# Patient Record
Sex: FEMALE | Race: WHITE | NOT HISPANIC OR LATINO | Employment: OTHER | URBAN - METROPOLITAN AREA
[De-identification: names, ages, dates, MRNs, and addresses within clinical notes are randomized per-mention and may not be internally consistent; named-entity substitution may affect disease eponyms.]

---

## 2017-08-07 ENCOUNTER — HISTORICAL (OUTPATIENT)
Dept: RADIOLOGY | Facility: HOSPITAL | Age: 72
End: 2017-08-07

## 2018-11-20 ENCOUNTER — HISTORICAL (OUTPATIENT)
Dept: RADIOLOGY | Facility: HOSPITAL | Age: 73
End: 2018-11-20

## 2020-01-02 ENCOUNTER — HISTORICAL (OUTPATIENT)
Dept: RADIOLOGY | Facility: HOSPITAL | Age: 75
End: 2020-01-02

## 2021-01-27 ENCOUNTER — HISTORICAL (OUTPATIENT)
Dept: RADIOLOGY | Facility: HOSPITAL | Age: 76
End: 2021-01-27

## 2021-02-11 ENCOUNTER — HISTORICAL (OUTPATIENT)
Dept: RADIOLOGY | Facility: HOSPITAL | Age: 76
End: 2021-02-11

## 2022-02-15 ENCOUNTER — HISTORICAL (OUTPATIENT)
Dept: RADIOLOGY | Facility: HOSPITAL | Age: 77
End: 2022-02-15

## 2022-02-15 ENCOUNTER — HISTORICAL (OUTPATIENT)
Dept: ADMINISTRATIVE | Facility: HOSPITAL | Age: 77
End: 2022-02-15

## 2022-02-23 ENCOUNTER — HISTORICAL (OUTPATIENT)
Dept: RADIOLOGY | Facility: HOSPITAL | Age: 77
End: 2022-02-23

## 2022-02-23 ENCOUNTER — HISTORICAL (OUTPATIENT)
Dept: ADMINISTRATIVE | Facility: HOSPITAL | Age: 77
End: 2022-02-23

## 2023-02-06 DIAGNOSIS — Z12.31 ENCOUNTER FOR SCREENING MAMMOGRAM FOR BREAST CANCER: Primary | ICD-10-CM

## 2023-02-20 ENCOUNTER — HOSPITAL ENCOUNTER (OUTPATIENT)
Dept: RADIOLOGY | Facility: HOSPITAL | Age: 78
Discharge: HOME | End: 2023-02-20
Attending: FAMILY MEDICINE
Payer: MEDICARE

## 2023-02-20 DIAGNOSIS — Z12.31 ENCOUNTER FOR SCREENING MAMMOGRAM FOR BREAST CANCER: ICD-10-CM

## 2023-02-20 PROCEDURE — 77067 MAMMO DIGITAL SCREENING BILAT WITH TOMO: ICD-10-PCS | Mod: 26,,, | Performed by: STUDENT IN AN ORGANIZED HEALTH CARE EDUCATION/TRAINING PROGRAM

## 2023-02-20 PROCEDURE — 77067 SCR MAMMO BI INCL CAD: CPT | Mod: 26,,, | Performed by: STUDENT IN AN ORGANIZED HEALTH CARE EDUCATION/TRAINING PROGRAM

## 2023-02-20 PROCEDURE — 77063 MAMMO DIGITAL SCREENING BILAT WITH TOMO: ICD-10-PCS | Mod: 26,,, | Performed by: STUDENT IN AN ORGANIZED HEALTH CARE EDUCATION/TRAINING PROGRAM

## 2023-02-20 PROCEDURE — 77063 BREAST TOMOSYNTHESIS BI: CPT | Mod: 26,,, | Performed by: STUDENT IN AN ORGANIZED HEALTH CARE EDUCATION/TRAINING PROGRAM

## 2023-02-20 PROCEDURE — 77067 SCR MAMMO BI INCL CAD: CPT | Mod: TC

## 2024-02-28 ENCOUNTER — HOSPITAL ENCOUNTER (OUTPATIENT)
Dept: RADIOLOGY | Facility: HOSPITAL | Age: 79
Discharge: HOME | End: 2024-02-28
Attending: FAMILY MEDICINE
Payer: MEDICARE

## 2024-02-28 DIAGNOSIS — Z12.31 ENCOUNTER FOR SCREENING MAMMOGRAM FOR MALIGNANT NEOPLASM OF BREAST: ICD-10-CM

## 2024-02-28 PROCEDURE — 77067 SCR MAMMO BI INCL CAD: CPT | Mod: 26,,, | Performed by: STUDENT IN AN ORGANIZED HEALTH CARE EDUCATION/TRAINING PROGRAM

## 2024-02-28 PROCEDURE — 77067 SCR MAMMO BI INCL CAD: CPT | Mod: TC

## 2024-02-28 PROCEDURE — 77063 BREAST TOMOSYNTHESIS BI: CPT | Mod: 26,,, | Performed by: STUDENT IN AN ORGANIZED HEALTH CARE EDUCATION/TRAINING PROGRAM

## 2024-04-18 DIAGNOSIS — F03.90 DEMENTIA: Primary | ICD-10-CM

## 2024-05-09 ENCOUNTER — OFFICE VISIT (OUTPATIENT)
Dept: NEUROLOGY | Facility: CLINIC | Age: 79
End: 2024-05-09
Payer: MEDICARE

## 2024-05-09 VITALS
HEIGHT: 64 IN | BODY MASS INDEX: 20.49 KG/M2 | SYSTOLIC BLOOD PRESSURE: 122 MMHG | DIASTOLIC BLOOD PRESSURE: 76 MMHG | WEIGHT: 120 LBS

## 2024-05-09 DIAGNOSIS — R41.9 COGNITIVE COMPLAINTS: ICD-10-CM

## 2024-05-09 DIAGNOSIS — F03.90 DEMENTIA, UNSPECIFIED DEMENTIA SEVERITY, UNSPECIFIED DEMENTIA TYPE, UNSPECIFIED WHETHER BEHAVIORAL, PSYCHOTIC, OR MOOD DISTURBANCE OR ANXIETY: Primary | ICD-10-CM

## 2024-05-09 PROCEDURE — 99205 OFFICE O/P NEW HI 60 MIN: CPT | Mod: S$PBB,,, | Performed by: NURSE PRACTITIONER

## 2024-05-09 PROCEDURE — 99999 PR PBB SHADOW E&M-EST. PATIENT-LVL III: CPT | Mod: PBBFAC,,, | Performed by: NURSE PRACTITIONER

## 2024-05-09 PROCEDURE — 99213 OFFICE O/P EST LOW 20 MIN: CPT | Mod: PBBFAC | Performed by: NURSE PRACTITIONER

## 2024-05-09 RX ORDER — MULTIVIT WITH MINERALS/HERBS
1 TABLET ORAL DAILY
COMMUNITY

## 2024-05-09 RX ORDER — TRAZODONE HYDROCHLORIDE 50 MG/1
50 TABLET ORAL NIGHTLY
COMMUNITY
Start: 2024-04-15

## 2024-05-09 RX ORDER — RIVAROXABAN 20 MG/1
20 TABLET, FILM COATED ORAL DAILY
COMMUNITY
Start: 2024-03-14

## 2024-05-09 RX ORDER — LIOTHYRONINE SODIUM 5 UG/1
5 TABLET ORAL DAILY
COMMUNITY

## 2024-05-09 RX ORDER — CHOLECALCIFEROL (VITAMIN D3) 25 MCG
1000 TABLET ORAL DAILY
COMMUNITY

## 2024-05-09 RX ORDER — METOPROLOL SUCCINATE 50 MG/1
50 TABLET, EXTENDED RELEASE ORAL DAILY
COMMUNITY
Start: 2024-04-06

## 2024-05-09 RX ORDER — UBIDECARENONE 30 MG
30 CAPSULE ORAL 3 TIMES DAILY
COMMUNITY

## 2024-05-09 RX ORDER — ATORVASTATIN CALCIUM 20 MG/1
20 TABLET, FILM COATED ORAL DAILY
COMMUNITY

## 2024-05-09 RX ORDER — DILTIAZEM HYDROCHLORIDE 120 MG/1
120 TABLET, FILM COATED ORAL
COMMUNITY
Start: 2024-03-17

## 2024-05-09 NOTE — PROGRESS NOTES
New Neurology Patient     SUBJECTIVE:  Patient ID: Pema Ruiz   78 y.o.   Referred By: Dr. Jennie Ag    Past Medical History:   Diagnosis Date    Basal cell carcinoma     Mixed hyperlipidemia     Paroxysmal atrial fibrillation    Breast cancer  Uterine cancer    Past Surgical History:   Procedure Laterality Date    APPENDECTOMY      HYSTERECTOMY      TONSILLECTOMY     Breast lumpectomy  Radiation of uterus    Review of patient's allergies indicates:  No Known Allergies    Chief Complaint: New Patient referred by  for Neurologic consultation to evaluate cognitive complaints     History of Present Illness:    New Patient referred by  for Neurologic consultation to evaluate cognitive complaints      Decline in memory for about 6-9 months and continues to decline.    Forgets names, conversations and repeats herself.     ADL's   - Living situation:  lives at home with    - Driving:    drives during the day. No accidents or not getting lost    - Cooking:   cooks   - Grooming:  no issues   - Highest level of education: Went through tate year of college   - Work: .    Family:   -   for 58 years  - Children: 3 girls    Finances:   -   manages finances    Appetite:  - Good/stable.   - No change in weight      Mood   - Depression:  no   - Anxiety:  yes   - Other behavior concerns/changes: blames herself for things out of her control     Safety concerns:   - No     Sleep  - Sleeps well most nights.   - Frequent nocturnal awakenings    Family history of dementia: Mom had AD    Tried 2 different medications for memory. Unsure of name of meds. Both caused GI upset    Has hearing aids but doesn't wear them      Review of Systems - as per HPI, otherwise a balanced 10 systems review is negative.      Current Medications:  Current Outpatient Medications   Medication Instructions    atorvastatin (LIPITOR) 20 mg, Oral, Daily    b complex vitamins tablet 1  "tablet, Oral, Daily    co-enzyme Q-10 30 mg, Oral, 3 times daily    diltiaZEM (CARDIZEM) 120 mg, Oral    liothyronine (CYTOMEL) 5 mcg, Oral, Daily    metoprolol succinate (TOPROL-XL) 50 mg, Oral, Daily    traZODone (DESYREL) 50 mg, Oral, Nightly    vitamin D (VITAMIN D3) 1,000 Units, Oral, Daily    XARELTO 20 mg, Oral, Daily       OBJECTIVE:  Vitals:  /76   Ht 5' 4" (1.626 m)   Wt 54.4 kg (120 lb)   BMI 20.60 kg/m²       General Exam  Accompanied by -   appearance - well appearing, no apparent distress, unassisted  oropharynx - Mallampati score class 2, ok dentition  skin- no obvious lesions noted    Neurological  cortical function - The patient is alert, attentive, and 26/30; cooperative with exam, good eye contact  Speech - clear and fluent   cranial nerves:  CN 2 - visual fields are full to confrontation.  Pupils are equal and react to light  CN 3, 4, 6 EOMs - normal. No ptosis or lateral gaze deviation  CN 7 - no face asymmetry; normal eye closure and smile  CN 8 - hearing is grossly normal  CN 9, 10, 11- palate elevates symmetrically. Shoulder shrug and head turn intact  CN 12 tongue - protrudes midline with normal movements and no atrophy  motor - No pronator drift. Muscle bulk and tone normal.  No lateralizing or focal deficits noted  gait - unassisted, posture upright. gait is steady with normal steps, arm swing and turning.    reflexes - absent at knees and  ankles  tone - normal  sensation - vib and light touch intact  coordination - finger to nose intact. Romberg absent    Review of Data:   Outside/ prior notes reviewed     Imaging:  No results found for this or any previous visit.      ASSESSMENT/ PLAN:    Problem List Items Addressed This Visit    None  Visit Diagnoses           Cognitive complaints         The following was discussed with both patient and :    - Differential diagnosis and treatment options    -  Will order CT head, and lab work including B12 and TSH       - " Screening for EFREM and importance of treatment, if present     - Reviewed meds for high anticholinergic burden or meds known to cause memory impairment. Avoid Benadryl      - Wearing prescribed hearing aids is beneficial to cognition      - Medications that are currently available are limited to AD disease;Medications have limited efficacy, however, these meds are used to prevent or delay deterioration of cognition in patients with AD.   Pt  Unsure of name of medication prescribed in the past for memory     - Safety concerns reviewed (home safety, polypharmacy and medication management, wandering, fall prevention, kitchen safety, support network, etc.)     - Legal and financial matters discussed;  Advise the family to review legal and financial matters, such as power of , advance directives, and lópez. It is important to ensure that necessary legal documents are in place while the patient can still participate in decision-making.     - To prevent further memory loss, some of the best preventative measures are following a healthy diet, getting regular exercise, and ensuring good sleep habits.       - Minimize or eliminate the use of alcohol, and discuss any prescription medications you might be taking with your doctor to avoid medications which can cause sedation or worsen cognitive function.    - Socializing with friends and family and staying both mentally and physically active is also very important. Continue with hobbies or activities that are engaging and practice activities that are mentally stimulating (word puzzles, Sudoku, etc) and stay active in the community.     F/u in 2 mo            Items discussed include acute and/or chronic neurological, sleep, or other issues and their attendant differential diagnoses.  Potential for additional testing, treatment options, and prognosis also discussed.  Questions and concerns were sought and answered to the patient's stated verbal satisfaction.    The patient  verbalizes understanding and agreement with the above stated treatment plan.     ___single dx _**__multiple issues/ diagnoses  ___ low __mod _*__ high complexity of data  ___low __mod _*__ high risks     Medical Decision Making (MDM) used for CPT choice:  ___low  ___moderate  __*__high        MISTI Martínez  [facility]  [phone]

## 2024-05-15 ENCOUNTER — LAB VISIT (OUTPATIENT)
Dept: LAB | Facility: HOSPITAL | Age: 79
End: 2024-05-15
Attending: NURSE PRACTITIONER
Payer: MEDICARE

## 2024-05-15 DIAGNOSIS — F03.90 DEMENTIA, UNSPECIFIED DEMENTIA SEVERITY, UNSPECIFIED DEMENTIA TYPE, UNSPECIFIED WHETHER BEHAVIORAL, PSYCHOTIC, OR MOOD DISTURBANCE OR ANXIETY: ICD-10-CM

## 2024-05-15 LAB
TSH SERPL-ACNC: 1.91 UIU/ML (ref 0.35–4.94)
VIT B12 SERPL-MCNC: 945 PG/ML (ref 213–816)

## 2024-05-15 PROCEDURE — 84443 ASSAY THYROID STIM HORMONE: CPT

## 2024-05-15 PROCEDURE — 82607 VITAMIN B-12: CPT

## 2024-05-15 PROCEDURE — 36415 COLL VENOUS BLD VENIPUNCTURE: CPT

## 2024-07-10 ENCOUNTER — OFFICE VISIT (OUTPATIENT)
Dept: PRIMARY CARE CLINIC | Facility: CLINIC | Age: 79
End: 2024-07-10
Payer: MEDICARE

## 2024-07-10 VITALS
SYSTOLIC BLOOD PRESSURE: 108 MMHG | RESPIRATION RATE: 17 BRPM | HEIGHT: 64 IN | OXYGEN SATURATION: 96 % | WEIGHT: 126 LBS | BODY MASS INDEX: 21.51 KG/M2 | DIASTOLIC BLOOD PRESSURE: 72 MMHG | HEART RATE: 65 BPM

## 2024-07-10 DIAGNOSIS — Z00.00 ENCOUNTER FOR MEDICAL EXAMINATION TO ESTABLISH CARE: ICD-10-CM

## 2024-07-10 DIAGNOSIS — E78.2 MIXED HYPERLIPIDEMIA: ICD-10-CM

## 2024-07-10 DIAGNOSIS — I48.0 PAROXYSMAL ATRIAL FIBRILLATION: Primary | ICD-10-CM

## 2024-07-10 DIAGNOSIS — M81.0 OSTEOPOROSIS WITHOUT CURRENT PATHOLOGICAL FRACTURE, UNSPECIFIED OSTEOPOROSIS TYPE: ICD-10-CM

## 2024-07-10 PROBLEM — I48.91 ATRIAL FIBRILLATION, UNSPECIFIED TYPE: Status: ACTIVE | Noted: 2024-07-10

## 2024-07-10 PROCEDURE — 99203 OFFICE O/P NEW LOW 30 MIN: CPT | Mod: ,,, | Performed by: FAMILY MEDICINE

## 2024-07-10 RX ORDER — DENOSUMAB 60 MG/ML
60 INJECTION SUBCUTANEOUS
COMMUNITY

## 2024-07-10 NOTE — PROGRESS NOTES
"  Family Medicine    Patient ID: [id number]     Chief Complaint: Establish Care      HPI:     Pema Ruiz is a 78 y.o. female here today to establish care.   Been on prolia 2014. Plans for blood work with Cardiology, appt tomorrow with Dr. Olivas.      Past Medical History:   Diagnosis Date    Basal cell carcinoma     Dementia     Hypothyroidism, unspecified     Mixed hyperlipidemia     Osteoporosis     Paroxysmal atrial fibrillation         Past Surgical History:   Procedure Laterality Date    APPENDECTOMY      BREAST SURGERY      Lump re.oval on left breast    EYE SURGERY      Cadarac surgery    FRACTURE SURGERY      Right shoulder break    HYSTERECTOMY      TONSILLECTOMY          Social History     Tobacco Use    Smoking status: Former     Types: Cigarettes     Passive exposure: Past    Smokeless tobacco: Never   Substance and Sexual Activity    Alcohol use: Not Currently    Drug use: Not Currently    Sexual activity: Not Currently     Partners: Male        Current Outpatient Medications   Medication Instructions    atorvastatin (LIPITOR) 20 mg, Oral, Daily    diltiaZEM (CARDIZEM) 120 mg, Oral    liothyronine (CYTOMEL) 5 mcg, Oral, Daily    metoprolol succinate (TOPROL-XL) 50 mg, Oral, Daily    PROLIA 60 mg, Subcutaneous, Every 6 months    traZODone (DESYREL) 50 mg, Oral, Nightly    vitamin D (VITAMIN D3) 1,000 Units, Oral, Daily    XARELTO 20 mg, Oral, Daily       Review of patient's allergies indicates:  No Known Allergies     Patient Care Team:  Eunice Bagley MD as PCP - General (Family Medicine)     Subjective:     Review of Systems    12 point review of systems conducted, negative except as stated in the history of present illness. See HPI for details.    Objective:     Visit Vitals  /72 (BP Location: Left arm, Patient Position: Sitting, BP Method: Medium (Automatic))   Pulse 65   Resp 17   Ht 5' 4" (1.626 m)   Wt 57.2 kg (126 lb)   SpO2 96%   BMI 21.63 kg/m²       Physical Exam  Vitals and " "nursing note reviewed.   Constitutional:       Appearance: Normal appearance.   HENT:      Mouth/Throat:      Mouth: Mucous membranes are moist.   Cardiovascular:      Rate and Rhythm: Normal rate and regular rhythm.   Pulmonary:      Effort: Pulmonary effort is normal.      Breath sounds: Normal breath sounds.   Neurological:      Mental Status: She is alert. Mental status is at baseline.   Psychiatric:         Mood and Affect: Mood normal.         Labs Reviewed:     Chemistry:  Lab Results   Component Value Date     05/04/2022    K 3.9 05/04/2022    BUN 15 05/04/2022    CREATININE 0.73 05/04/2022    CALCIUM 8.9 05/04/2022    TSH 1.905 05/15/2024        No results found for: "HGBA1C", "MICROALBCREA"     Hematology:  No results found for: "WBC", "HGB", "HCT", "PLT"    Lipid Panel:  Lab Results   Component Value Date    CHOL 139 04/29/2022    HDL 63 04/29/2022    TRIG 72 04/29/2022        Urine:  No results found for: "COLORUA", "APPEARANCEUA", "SGUA", "PHUA", "PROTEINUA", "GLUCOSEUA", "KETONESUA", "BLOODUA", "NITRITESUA", "LEUKOCYTESUR", "RBCUA", "WBCUA", "BACTERIA", "SQEPUA", "HYALINECASTS", "CREATRANDUR", "PROTEINURINE", "UPROTCREA"     Assessment:       ICD-10-CM ICD-9-CM   1. Paroxysmal atrial fibrillation  I48.0 427.31   2. Encounter for medical examination to establish care  Z00.00 V70.9   3. Mixed hyperlipidemia  E78.2 272.2   4. Osteoporosis without current pathological fracture, unspecified osteoporosis type  M81.0 733.00        Plan:     1. Paroxysmal atrial fibrillation  Overview:  Stable.  Continue medications. Will review labs when done by Cardiology.  Hypertension Medications               diltiaZEM (CARDIZEM) 120 MG tablet Take 120 mg by mouth.    metoprolol succinate (TOPROL-XL) 50 MG 24 hr tablet Take 50 mg by mouth once daily.                2. Encounter for medical examination to establish care  Overview:  Wellness planned for 1/2025.        3. Mixed hyperlipidemia  Overview:  Continue dash " diet and regular exercise. Continue Lipitor.        4. Osteoporosis without current pathological fracture, unspecified osteoporosis type  Overview:  On year 10 of prolia.  Will stop the prolia.  Plan for DEXA scan in about 6 months.             Follow up in about 6 months (around 1/10/2025) for Medicare Wellness. In addition to their scheduled follow up, the patient has also been instructed to follow up on as needed basis.     Future Appointments   Date Time Provider Department Center   9/10/2024 10:30 AM Franklin Rodriguez MD [address] [place]   1/13/2025 10:00 AM Eunice Bagley MD [facility] [place]         Eunice Bagley MD

## 2024-09-23 ENCOUNTER — OFFICE VISIT (OUTPATIENT)
Dept: PRIMARY CARE CLINIC | Facility: CLINIC | Age: 79
End: 2024-09-23
Payer: MEDICARE

## 2024-09-23 VITALS
DIASTOLIC BLOOD PRESSURE: 80 MMHG | RESPIRATION RATE: 18 BRPM | HEIGHT: 64 IN | WEIGHT: 121.81 LBS | TEMPERATURE: 98 F | OXYGEN SATURATION: 99 % | SYSTOLIC BLOOD PRESSURE: 132 MMHG | BODY MASS INDEX: 20.79 KG/M2 | HEART RATE: 58 BPM

## 2024-09-23 DIAGNOSIS — L98.491 CALLOUS ULCER, LIMITED TO BREAKDOWN OF SKIN: Primary | ICD-10-CM

## 2024-09-23 PROCEDURE — 99213 OFFICE O/P EST LOW 20 MIN: CPT | Mod: ,,, | Performed by: FAMILY MEDICINE

## 2024-09-23 NOTE — PROGRESS NOTES
Family Medicine    Patient ID: [id number]     Chief Complaint: sore under right foot (X1 month)      HPI:     Pema Ruiz is a 78 y.o. female here today for concern of lesion of the bottom of the R foot noticed about 1 month ago.  Mild pressure pain, tried neosporin with minimal change.  No known FB.  No drainage.     Past Medical History:   Diagnosis Date    Basal cell carcinoma     Dementia     Hypothyroidism, unspecified     Mixed hyperlipidemia     Osteoporosis     Paroxysmal atrial fibrillation         Past Surgical History:   Procedure Laterality Date    APPENDECTOMY      BREAST SURGERY      Lump re.oval on left breast    EYE SURGERY      Cadarac surgery    FRACTURE SURGERY      Right shoulder break    HYSTERECTOMY      TONSILLECTOMY          Social History     Tobacco Use    Smoking status: Former     Types: Cigarettes     Passive exposure: Past    Smokeless tobacco: Never   Substance and Sexual Activity    Alcohol use: Not Currently    Drug use: Not Currently    Sexual activity: Not Currently     Partners: Male        Current Outpatient Medications   Medication Instructions    atorvastatin (LIPITOR) 20 mg, Oral, Daily    diltiaZEM (CARDIZEM) 120 mg, Oral    liothyronine (CYTOMEL) 5 mcg, Oral, Daily    metoprolol succinate (TOPROL-XL) 50 mg, Oral, Daily    PROLIA 60 mg, Subcutaneous, Every 6 months    traZODone (DESYREL) 50 mg, Oral, Nightly    vitamin D (VITAMIN D3) 1,000 Units, Oral, Daily    XARELTO 20 mg, Oral, Daily       Review of patient's allergies indicates:  No Known Allergies     Patient Care Team:  Eunice Bagley MD as PCP - General (Family Medicine)  Aubrey Olivas MD as Consulting Physician (Cardiovascular Disease)     Subjective:     Review of Systems    12 point review of systems conducted, negative except as stated in the history of present illness. See HPI for details.    Objective:     Visit Vitals  /80 (BP Location: Left arm, Patient Position: Sitting, BP Method: Large  "(Automatic))   Pulse (!) 58   Temp 97.8 °F (36.6 °C)   Resp 18   Ht 5' 4" (1.626 m)   Wt 55.2 kg (121 lb 12.8 oz)   SpO2 99%   BMI 20.91 kg/m²       Physical Exam  Vitals and nursing note reviewed.   Constitutional:       Appearance: Normal appearance.   HENT:      Mouth/Throat:      Mouth: Mucous membranes are moist.   Pulmonary:      Effort: Pulmonary effort is normal.   Skin:     Comments: Ball of the L foot with thickened skin approx 2x2cm, no fluctuance, no erythema, no exudate, additional callous at the base of the 1st digit and lateral tip of the 4th toe.    Neurological:      Mental Status: She is alert.   Psychiatric:         Mood and Affect: Mood normal.         Labs Reviewed:     Chemistry:  Lab Results   Component Value Date     05/04/2022    K 3.9 05/04/2022    BUN 15 05/04/2022    CREATININE 0.73 05/04/2022    CALCIUM 8.9 05/04/2022    TSH 1.905 05/15/2024        No results found for: "HGBA1C", "MICROALBCREA"     Hematology:  No results found for: "WBC", "HGB", "HCT", "PLT"    Lipid Panel:  Lab Results   Component Value Date    CHOL 139 04/29/2022    HDL 63 04/29/2022    TRIG 72 04/29/2022        Urine:  No results found for: "COLORUA", "APPEARANCEUA", "SGUA", "PHUA", "PROTEINUA", "GLUCOSEUA", "KETONESUA", "BLOODUA", "NITRITESUA", "LEUKOCYTESUR", "RBCUA", "WBCUA", "BACTERIA", "SQEPUA", "HYALINECASTS", "CREATRANDUR", "PROTEINURINE", "UPROTCREA"     Assessment:       ICD-10-CM ICD-9-CM   1. Callous ulcer, limited to breakdown of skin  L98.491 707.8        Plan:     1. Callous ulcer, limited to breakdown of skin  Overview:  Continue supportive shoe.  Apply vaseline at bedtime. In the morning use an abrasive tool like a Pedi Egg to slowly break down the thickened layers of the callous.  Avoid direct pressure when possible while walking.  Discussed seeing Dr. Domingo Podiatrist if not improving.            No follow-ups on file. In addition to their scheduled follow up, the patient has also been " instructed to follow up on as needed basis.     Future Appointments   Date Time Provider Department Center   1/13/2025 10:00 AM Eunice Bagley MD [facility] [place]

## 2024-09-24 PROBLEM — L98.491 CALLOUS ULCER, LIMITED TO BREAKDOWN OF SKIN: Status: ACTIVE | Noted: 2024-09-24

## 2024-10-14 ENCOUNTER — OFFICE VISIT (OUTPATIENT)
Dept: PRIMARY CARE CLINIC | Facility: CLINIC | Age: 79
End: 2024-10-14
Payer: MEDICARE

## 2024-10-14 VITALS
DIASTOLIC BLOOD PRESSURE: 79 MMHG | HEIGHT: 64 IN | RESPIRATION RATE: 17 BRPM | SYSTOLIC BLOOD PRESSURE: 115 MMHG | BODY MASS INDEX: 19.63 KG/M2 | OXYGEN SATURATION: 96 % | WEIGHT: 115 LBS | TEMPERATURE: 100 F | HEART RATE: 92 BPM

## 2024-10-14 DIAGNOSIS — H65.01 NON-RECURRENT ACUTE SEROUS OTITIS MEDIA OF RIGHT EAR: Primary | ICD-10-CM

## 2024-10-14 PROBLEM — Z00.00 ENCOUNTER FOR MEDICAL EXAMINATION TO ESTABLISH CARE: Status: RESOLVED | Noted: 2024-07-10 | Resolved: 2024-10-14

## 2024-10-14 PROCEDURE — 99214 OFFICE O/P EST MOD 30 MIN: CPT | Mod: ,,, | Performed by: FAMILY MEDICINE

## 2024-10-14 RX ORDER — AMOXICILLIN AND CLAVULANATE POTASSIUM 875; 125 MG/1; MG/1
1 TABLET, FILM COATED ORAL EVERY 12 HOURS
Qty: 14 TABLET | Refills: 0 | Status: SHIPPED | OUTPATIENT
Start: 2024-10-14 | End: 2024-10-21

## 2024-10-14 NOTE — PROGRESS NOTES
Family Medicine    Patient ID: [id number]     Chief Complaint: URI      HPI:     Pema Ruiz is a 79 y.o. female here today for about 2 weeks of cough, congestion, sinusitis.  Initially had fever now with elevated temp 99.9.  No CP or SOB.  No known exposures.     Past Medical History:   Diagnosis Date    Basal cell carcinoma     Dementia     Hypothyroidism, unspecified     Mixed hyperlipidemia     Osteoporosis     Paroxysmal atrial fibrillation         Past Surgical History:   Procedure Laterality Date    APPENDECTOMY      BREAST SURGERY      Lump re.oval on left breast    EYE SURGERY      Cadarac surgery    FRACTURE SURGERY      Right shoulder break    HYSTERECTOMY      TONSILLECTOMY          Social History     Tobacco Use    Smoking status: Former     Types: Cigarettes     Passive exposure: Past    Smokeless tobacco: Never   Substance and Sexual Activity    Alcohol use: Not Currently    Drug use: Not Currently    Sexual activity: Not Currently     Partners: Male        Current Outpatient Medications   Medication Instructions    amoxicillin-clavulanate 875-125mg (AUGMENTIN) 875-125 mg per tablet 1 tablet, Oral, Every 12 hours    atorvastatin (LIPITOR) 20 mg, Daily    diltiaZEM (CARDIZEM) 120 mg    liothyronine (CYTOMEL) 5 mcg, Daily    metoprolol succinate (TOPROL-XL) 50 mg, Daily    PROLIA 60 mg, Every 6 months    traZODone (DESYREL) 50 mg, Nightly    vitamin D (VITAMIN D3) 1,000 Units, Daily    XARELTO 20 mg, Daily       Review of patient's allergies indicates:  No Known Allergies     Patient Care Team:  Eunice Bagley MD as PCP - General (Family Medicine)  Aubrey Olivas MD as Consulting Physician (Cardiovascular Disease)     Subjective:     Review of Systems   Constitutional:  Negative for chills, fatigue and fever.   HENT:  Positive for congestion, ear pain and postnasal drip. Negative for sinus pain, sore throat and trouble swallowing.    Respiratory:  Positive for cough. Negative for chest  "tightness and shortness of breath.    Cardiovascular:  Negative for chest pain.   Skin:  Negative for rash.   Neurological:  Negative for dizziness.   Psychiatric/Behavioral:  Negative for confusion.        12 point review of systems conducted, negative except as stated in the history of present illness. See HPI for details.    Objective:     Visit Vitals  /79   Pulse 92   Temp 99.9 °F (37.7 °C) (Oral)   Resp 17   Ht 5' 4" (1.626 m)   Wt 52.2 kg (115 lb)   SpO2 96%   BMI 19.74 kg/m²       Physical Exam  Vitals and nursing note reviewed.   Constitutional:       Appearance: Normal appearance. She is obese. She is not ill-appearing.   HENT:      Right Ear: External ear normal.      Left Ear: Tympanic membrane, ear canal and external ear normal.      Ears:      Comments: R TM with bulging and erythema with erythematous crusted canal.     Nose: Congestion present.      Mouth/Throat:      Pharynx: Posterior oropharyngeal erythema present.   Eyes:      Conjunctiva/sclera: Conjunctivae normal.   Cardiovascular:      Rate and Rhythm: Normal rate and regular rhythm.   Pulmonary:      Effort: Pulmonary effort is normal.      Breath sounds: Normal breath sounds.   Lymphadenopathy:      Cervical: No cervical adenopathy.   Skin:     General: Skin is warm.   Neurological:      General: No focal deficit present.      Mental Status: She is alert.   Psychiatric:         Mood and Affect: Mood normal.       Labs Reviewed:     Chemistry:  Lab Results   Component Value Date     05/04/2022    K 3.9 05/04/2022    BUN 15 05/04/2022    CREATININE 0.73 05/04/2022    CALCIUM 8.9 05/04/2022    TSH 1.905 05/15/2024        No results found for: "HGBA1C", "MICROALBCREA"     Hematology:  No results found for: "WBC", "HGB", "HCT", "PLT"    Lipid Panel:  Lab Results   Component Value Date    CHOL 139 04/29/2022    HDL 63 04/29/2022    TRIG 72 04/29/2022        Urine:  No results found for: "COLORUA", "APPEARANCEUA", "SGUA", "PHUA", " ""PROTEINUA", "GLUCOSEUA", "KETONESUA", "BLOODUA", "NITRITESUA", "LEUKOCYTESUR", "RBCUA", "WBCUA", "BACTERIA", "SQEPUA", "HYALINECASTS", "CREATRANDUR", "PROTEINURINE", "UPROTCREA"     Assessment:       ICD-10-CM ICD-9-CM   1. Non-recurrent acute serous otitis media of right ear  H65.01 381.01        Plan:     1. Non-recurrent acute serous otitis media of right ear  Overview:  Saline nasal spray twice a day, antihistamine at bedtime.  Force fluids.  Honey for sore throat.  Augmentin with food. Cough may linger a few weeks but should not have fever, chest pain, or shortness of breath.       Orders:  -     amoxicillin-clavulanate 875-125mg (AUGMENTIN) 875-125 mg per tablet; Take 1 tablet by mouth every 12 (twelve) hours. for 7 days  Dispense: 14 tablet; Refill: 0         Follow up if symptoms worsen or fail to improve. In addition to their scheduled follow up, the patient has also been instructed to follow up on as needed basis.     Future Appointments   Date Time Provider Department Center   1/13/2025 10:00 AM [facility]         Eunice Bagley MD  "

## 2025-01-06 DIAGNOSIS — I10 HYPERTENSION, UNSPECIFIED TYPE: ICD-10-CM

## 2025-01-06 DIAGNOSIS — Z00.00 WELLNESS EXAMINATION: Primary | ICD-10-CM

## 2025-01-08 ENCOUNTER — LAB VISIT (OUTPATIENT)
Dept: LAB | Facility: HOSPITAL | Age: 80
End: 2025-01-08
Attending: FAMILY MEDICINE
Payer: MEDICARE

## 2025-01-08 DIAGNOSIS — Z00.00 WELLNESS EXAMINATION: ICD-10-CM

## 2025-01-08 DIAGNOSIS — I10 HYPERTENSION, UNSPECIFIED TYPE: ICD-10-CM

## 2025-01-08 LAB
ALBUMIN SERPL-MCNC: 3.9 G/DL (ref 3.4–4.8)
ALBUMIN/GLOB SERPL: 1.4 RATIO (ref 1.1–2)
ALP SERPL-CCNC: 67 UNIT/L (ref 40–150)
ALT SERPL-CCNC: 21 UNIT/L (ref 0–55)
ANION GAP SERPL CALC-SCNC: 11 MEQ/L
AST SERPL-CCNC: 29 UNIT/L (ref 5–34)
BACTERIA #/AREA URNS AUTO: ABNORMAL /HPF
BASOPHILS # BLD AUTO: 0.03 X10(3)/MCL
BASOPHILS NFR BLD AUTO: 0.7 %
BILIRUB SERPL-MCNC: 0.6 MG/DL
BILIRUB UR QL STRIP.AUTO: NEGATIVE
BUN SERPL-MCNC: 17.3 MG/DL (ref 9.8–20.1)
CALCIUM SERPL-MCNC: 9.2 MG/DL (ref 8.4–10.2)
CHLORIDE SERPL-SCNC: 107 MMOL/L (ref 98–107)
CHOLEST SERPL-MCNC: 182 MG/DL
CHOLEST/HDLC SERPL: 2 {RATIO} (ref 0–5)
CLARITY UR: CLEAR
CO2 SERPL-SCNC: 20 MMOL/L (ref 23–31)
COLOR UR AUTO: YELLOW
CREAT SERPL-MCNC: 0.84 MG/DL (ref 0.55–1.02)
CREAT/UREA NIT SERPL: 21
EOSINOPHIL # BLD AUTO: 0.05 X10(3)/MCL (ref 0–0.9)
EOSINOPHIL NFR BLD AUTO: 1.2 %
ERYTHROCYTE [DISTWIDTH] IN BLOOD BY AUTOMATED COUNT: 14 % (ref 11.5–17)
GFR SERPLBLD CREATININE-BSD FMLA CKD-EPI: >60 ML/MIN/1.73/M2
GLOBULIN SER-MCNC: 2.7 GM/DL (ref 2.4–3.5)
GLUCOSE SERPL-MCNC: 92 MG/DL (ref 82–115)
GLUCOSE UR QL STRIP: NORMAL
HCT VFR BLD AUTO: 39.3 % (ref 37–47)
HDLC SERPL-MCNC: 87 MG/DL (ref 35–60)
HGB BLD-MCNC: 12.5 G/DL (ref 12–16)
HGB UR QL STRIP: ABNORMAL
HYALINE CASTS #/AREA URNS LPF: ABNORMAL /LPF
IMM GRANULOCYTES # BLD AUTO: 0.02 X10(3)/MCL (ref 0–0.04)
IMM GRANULOCYTES NFR BLD AUTO: 0.5 %
KETONES UR QL STRIP: NEGATIVE
LDLC SERPL CALC-MCNC: 82 MG/DL (ref 50–140)
LEUKOCYTE ESTERASE UR QL STRIP: NEGATIVE
LYMPHOCYTES # BLD AUTO: 1.02 X10(3)/MCL (ref 0.6–4.6)
LYMPHOCYTES NFR BLD AUTO: 24.1 %
MCH RBC QN AUTO: 30.1 PG (ref 27–31)
MCHC RBC AUTO-ENTMCNC: 31.8 G/DL (ref 33–36)
MCV RBC AUTO: 94.7 FL (ref 80–94)
MONOCYTES # BLD AUTO: 0.36 X10(3)/MCL (ref 0.1–1.3)
MONOCYTES NFR BLD AUTO: 8.5 %
MUCOUS THREADS URNS QL MICRO: ABNORMAL /LPF
NEUTROPHILS # BLD AUTO: 2.75 X10(3)/MCL (ref 2.1–9.2)
NEUTROPHILS NFR BLD AUTO: 65 %
NITRITE UR QL STRIP: NEGATIVE
NRBC BLD AUTO-RTO: 0 %
PH UR STRIP: 5.5 [PH]
PLATELET # BLD AUTO: 223 X10(3)/MCL (ref 130–400)
PMV BLD AUTO: 10.4 FL (ref 7.4–10.4)
POTASSIUM SERPL-SCNC: 4.3 MMOL/L (ref 3.5–5.1)
PROT SERPL-MCNC: 6.6 GM/DL (ref 5.8–7.6)
PROT UR QL STRIP: ABNORMAL
RBC # BLD AUTO: 4.15 X10(6)/MCL (ref 4.2–5.4)
RBC #/AREA URNS AUTO: ABNORMAL /HPF
SODIUM SERPL-SCNC: 138 MMOL/L (ref 136–145)
SP GR UR STRIP.AUTO: 1.02 (ref 1–1.03)
SQUAMOUS #/AREA URNS LPF: ABNORMAL /HPF
TRIGL SERPL-MCNC: 64 MG/DL (ref 37–140)
TSH SERPL-ACNC: 1.87 UIU/ML (ref 0.35–4.94)
UROBILINOGEN UR STRIP-ACNC: NORMAL
VLDLC SERPL CALC-MCNC: 13 MG/DL
WBC # BLD AUTO: 4.23 X10(3)/MCL (ref 4.5–11.5)
WBC #/AREA URNS AUTO: ABNORMAL /HPF

## 2025-01-08 PROCEDURE — 80061 LIPID PANEL: CPT

## 2025-01-08 PROCEDURE — 80053 COMPREHEN METABOLIC PANEL: CPT

## 2025-01-08 PROCEDURE — 85025 COMPLETE CBC W/AUTO DIFF WBC: CPT

## 2025-01-08 PROCEDURE — 81001 URINALYSIS AUTO W/SCOPE: CPT

## 2025-01-08 PROCEDURE — 36415 COLL VENOUS BLD VENIPUNCTURE: CPT

## 2025-01-08 PROCEDURE — 84443 ASSAY THYROID STIM HORMONE: CPT

## 2025-01-10 NOTE — PROGRESS NOTES
Internal Medicine      Patient ID: [id number]     Chief Complaint: Medicare Annual Wellness     HPI:     Pema Ruiz is a 79 y.o. female here today for a Medicare Annual Wellness visit and comprehensive Health Risk Assessment.     A separate E/M code has been provided to evaluate additional complaints that the patient would like addressed during the dedicated Medicare Wellness Exam.    Health Maintenance         Date Due Completion Date    TETANUS VACCINE Never done ---    DEXA Scan Never done ---    Shingles Vaccine (1 of 2) Never done ---    RSV Vaccine (Age 60+ and Pregnant patients) (1 - 1-dose 75+ series) Never done ---    Influenza Vaccine (1) 09/01/2024 1/19/2024    COVID-19 Vaccine (5 - 2024-25 season) 09/01/2024 7/18/2022    Lipid Panel 01/08/2030 1/8/2025             Past Medical History:   Diagnosis Date    Basal cell carcinoma     Dementia     Hypothyroidism, unspecified     Mixed hyperlipidemia     Osteoporosis     Paroxysmal atrial fibrillation         Past Surgical History:   Procedure Laterality Date    APPENDECTOMY      BREAST SURGERY      Lump re.oval on left breast    EYE SURGERY      Cadarac surgery    FRACTURE SURGERY      Right shoulder break    HYSTERECTOMY      TONSILLECTOMY          Social History     Socioeconomic History    Marital status:    Tobacco Use    Smoking status: Never     Passive exposure: Never    Smokeless tobacco: Never   Substance and Sexual Activity    Alcohol use: Not Currently    Drug use: Not Currently    Sexual activity: Not Currently     Partners: Male     Social Drivers of Health     Financial Resource Strain: Low Risk  (7/8/2024)    Overall Financial Resource Strain (CARDIA)     Difficulty of Paying Living Expenses: Not very hard   Food Insecurity: No Food Insecurity (7/8/2024)    Hunger Vital Sign     Worried About Running Out of Food in the Last Year: Never true     Ran Out of Food in the Last Year: Never true   Physical Activity: Insufficiently  Active (7/8/2024)    Exercise Vital Sign     Days of Exercise per Week: 1 day     Minutes of Exercise per Session: 20 min   Stress: Stress Concern Present (7/8/2024)    [employer] - Occupational Stress Questionnaire     Feeling of Stress : To some extent   Housing Stability: Unknown (7/8/2024)    Housing Stability Vital Sign     Unable to Pay for Housing in the Last Year: No        Family History   Problem Relation Name Age of Onset    Alzheimer's disease Mother      Heart disease Father Ari Valenzuela         Current Outpatient Medications   Medication Instructions    diltiaZEM (CARDIZEM) 120 mg    liothyronine (CYTOMEL) 5 mcg, Daily    metoprolol succinate (TOPROL-XL) 50 mg, Daily    PROLIA 60 mg, Every 6 months    traZODone (DESYREL) 50 mg, Oral, Nightly    XARELTO 20 mg, Daily       Review of patient's allergies indicates:  No Known Allergies     Immunization History   Administered Date(s) Administered    COVID-19 MRNA, LN-S PF (MODERNA HALF 0.25 ML DOSE) 11/16/2021, 07/18/2022    COVID-19, MRNA, LN-S, PF (MODERNA FULL 0.5 ML DOSE) 02/25/2021, 03/22/2021    Influenza - Quadrivalent - High Dose - PF (65 years and older) 09/27/2021, 11/20/2022    Influenza - Quadrivalent - PF *Preferred* (6 months and older) 12/15/2015, 01/19/2024    Influenza - Trivalent - Afluria, Fluzone MDV 10/07/2014    Influenza - Trivalent - Fluzone High Dose - PF (65 years and older) 11/12/2018    Pneumococcal Conjugate - 13 Valent 02/04/2016    Pneumococcal Conjugate - 20 Valent 04/16/2024        Patient Care Team:  Eunice Bagley MD as PCP - General (Family Medicine)  Aubrey Olivas MD as Consulting Physician (Cardiovascular Disease)    Subjective:     Review of Systems    12 point review of systems conducted, negative except as stated in the history of present illness. See HPI for details.    Objective:     Visit Vitals  /77 (BP Location: Left arm, Patient Position: Sitting)   Pulse 63   Temp 98.6  "°F (37 °C)   Resp 18   Ht 5' 4" (1.626 m)   Wt 52.6 kg (116 lb)   SpO2 97%   BMI 19.91 kg/m²       Physical Exam  Vitals and nursing note reviewed.   Constitutional:       Appearance: Normal appearance.   HENT:      Mouth/Throat:      Mouth: Mucous membranes are moist.   Cardiovascular:      Rate and Rhythm: Normal rate and regular rhythm.   Pulmonary:      Effort: Pulmonary effort is normal.      Breath sounds: Normal breath sounds.   Neurological:      General: No focal deficit present.      Mental Status: She is alert.   Psychiatric:         Mood and Affect: Mood normal.       Assessment:       ICD-10-CM ICD-9-CM   1. Wellness examination  Z00.00 V70.0   2. Dementia, unspecified dementia severity, unspecified dementia type, unspecified whether behavioral, psychotic, or mood disturbance or anxiety  F03.90 294.20   3. Abnormal finding of blood chemistry, unspecified  R79.9 790.6   4. Insomnia, unspecified type  G47.00 780.52   5. Mixed hyperlipidemia  E78.2 272.2   6. Age-related osteoporosis without current pathological fracture  M81.0 733.01        Plan:     1. Wellness examination  Overview:  Health goals discussed.    Decided to stop prolia and statin.      Orders:  -     CBC Auto Differential; Future; Expected date: 01/13/2026  -     Comprehensive Metabolic Panel; Future; Expected date: 01/13/2026  -     Lipid Panel; Future; Expected date: 01/13/2026  -     TSH; Future; Expected date: 01/13/2026    2. Dementia, unspecified dementia severity, unspecified dementia type, unspecified whether behavioral, psychotic, or mood disturbance or anxiety  Overview:  Stable.  Able to do ADLs.        3. Abnormal finding of blood chemistry, unspecified  -     CBC Auto Differential; Future; Expected date: 01/13/2026    4. Insomnia, unspecified type  Assessment & Plan:  Stable.  Trazodone refilled.      Orders:  -     Discontinue: traZODone (DESYREL) 50 MG tablet; Take 1 tablet (50 mg total) by mouth every evening.  Dispense: 90 " "tablet; Refill: 3  -     traZODone (DESYREL) 50 MG tablet; Take 1 tablet (50 mg total) by mouth every evening.  Dispense: 90 tablet; Refill: 3    5. Mixed hyperlipidemia  Overview:  Continue dash diet and regular exercise. Discontinue Lipitor.        6. Age-related osteoporosis without current pathological fracture  Overview:  On year 10 of prolia.  Will stop the prolia.  Plan for DEXA scan in 2026.      Other orders  -     Discontinue: traZODone (DESYREL) 50 MG tablet; Take 1 tablet (50 mg total) by mouth every evening.  Dispense: 90 tablet; Refill: 3         The following assessments were completed and reviewed. See completed screening forms and assessments within the Encounter Summary.   [x] Health Risk Assessment   [x] CVD Risk Factors   [x] Obesity/Physical Activity -  Encouraged daily 30 minute physical activity x 5 days per week.   [x] Home Safety/Living Situation   [x] Alcohol Screen  [x] Depression (PHQ) Screen   [x] Timed Get Up and Go   [x] Whisper Test  [x] Cognitive Function/Impairment Screen   [x] Nutrition Screening  [x] ADL Screen   [x] Opioid Screen:  [x] Patient does not have a prescription for opioids.   [] Patient has a prescription for opioids but is at low risk for abuse.   [x] Substance Abuse Screen:   [x] Patient does not use illicit substances.   [] Patient screens positive for substance use disorder.   Advance Care Planning   Advance Care Planning     Date: 01/10/2025  Pt desires  Wade "Daniel" Joseph to make her medical choices when she is unable.  If not available Molly Hutchinson her oldest daughter will make the decisions.      I attest to discussing Advance Care Planning with patient and/or family member.  Education was provided including the importance of the Health Care Power of , Advance Directives, and/or [place] documentation.  The patient expressed understanding to the importance of this information and discussion.       Provided patient with a 5-10 year written " screening schedule and personal prevention plan. Recommendations were developed using the USPSTF age appropriate recommendations. Education, counseling, and referrals were provided as needed. After Visit Summary printed and given to patient, which includes a list of additional screenings\tests needed.    Follow up for Medicare Wellness, With labwork prior to visit. In addition to their scheduled follow up, the patient has also been instructed to follow up on as needed basis.     Future Appointments   Date Time Provider Department Center   1/15/2026 10:00 AM Eunice Bagley MD [facility] Olegario Bagley MD

## 2025-01-13 ENCOUNTER — OFFICE VISIT (OUTPATIENT)
Dept: PRIMARY CARE CLINIC | Facility: CLINIC | Age: 80
End: 2025-01-13
Payer: MEDICARE

## 2025-01-13 VITALS
DIASTOLIC BLOOD PRESSURE: 77 MMHG | OXYGEN SATURATION: 97 % | WEIGHT: 116 LBS | BODY MASS INDEX: 19.81 KG/M2 | RESPIRATION RATE: 18 BRPM | TEMPERATURE: 99 F | HEIGHT: 64 IN | HEART RATE: 63 BPM | SYSTOLIC BLOOD PRESSURE: 116 MMHG

## 2025-01-13 DIAGNOSIS — E78.2 MIXED HYPERLIPIDEMIA: ICD-10-CM

## 2025-01-13 DIAGNOSIS — F03.90 DEMENTIA, UNSPECIFIED DEMENTIA SEVERITY, UNSPECIFIED DEMENTIA TYPE, UNSPECIFIED WHETHER BEHAVIORAL, PSYCHOTIC, OR MOOD DISTURBANCE OR ANXIETY: ICD-10-CM

## 2025-01-13 DIAGNOSIS — G47.00 INSOMNIA, UNSPECIFIED TYPE: ICD-10-CM

## 2025-01-13 DIAGNOSIS — R79.9 ABNORMAL FINDING OF BLOOD CHEMISTRY, UNSPECIFIED: ICD-10-CM

## 2025-01-13 DIAGNOSIS — Z00.00 WELLNESS EXAMINATION: Primary | ICD-10-CM

## 2025-01-13 DIAGNOSIS — M81.0 AGE-RELATED OSTEOPOROSIS WITHOUT CURRENT PATHOLOGICAL FRACTURE: ICD-10-CM

## 2025-01-13 PROBLEM — L98.491 CALLOUS ULCER, LIMITED TO BREAKDOWN OF SKIN: Status: RESOLVED | Noted: 2024-09-24 | Resolved: 2025-01-13

## 2025-01-13 PROBLEM — I48.0 PAROXYSMAL ATRIAL FIBRILLATION: Status: RESOLVED | Noted: 2024-07-10 | Resolved: 2025-01-13

## 2025-01-13 PROBLEM — H65.01 NON-RECURRENT ACUTE SEROUS OTITIS MEDIA OF RIGHT EAR: Status: RESOLVED | Noted: 2024-10-14 | Resolved: 2025-01-13

## 2025-01-13 PROCEDURE — G0439 PPPS, SUBSEQ VISIT: HCPCS | Mod: ,,, | Performed by: FAMILY MEDICINE

## 2025-01-13 RX ORDER — TRAZODONE HYDROCHLORIDE 50 MG/1
50 TABLET ORAL NIGHTLY
Qty: 90 TABLET | Refills: 3 | Status: SHIPPED | OUTPATIENT
Start: 2025-01-13 | End: 2025-01-13

## 2025-01-13 RX ORDER — TRAZODONE HYDROCHLORIDE 50 MG/1
50 TABLET ORAL NIGHTLY
Qty: 90 TABLET | Refills: 3 | Status: SHIPPED | OUTPATIENT
Start: 2025-01-13 | End: 2026-01-13

## 2025-02-21 DIAGNOSIS — E78.5 HYPERLIPIDEMIA, UNSPECIFIED HYPERLIPIDEMIA TYPE: Primary | ICD-10-CM

## 2025-02-21 RX ORDER — LIOTHYRONINE SODIUM 5 UG/1
5 TABLET ORAL
Qty: 90 TABLET | Refills: 1 | Status: SHIPPED | OUTPATIENT
Start: 2025-02-21 | End: 2025-05-22

## 2025-03-12 ENCOUNTER — OFFICE VISIT (OUTPATIENT)
Dept: PRIMARY CARE CLINIC | Facility: CLINIC | Age: 80
End: 2025-03-12
Payer: MEDICARE

## 2025-03-12 VITALS
BODY MASS INDEX: 19.77 KG/M2 | OXYGEN SATURATION: 100 % | WEIGHT: 115.81 LBS | DIASTOLIC BLOOD PRESSURE: 62 MMHG | HEIGHT: 64 IN | HEART RATE: 59 BPM | SYSTOLIC BLOOD PRESSURE: 97 MMHG

## 2025-03-12 DIAGNOSIS — R31.0 GROSS HEMATURIA: Primary | ICD-10-CM

## 2025-03-12 PROCEDURE — 99213 OFFICE O/P EST LOW 20 MIN: CPT | Mod: ,,, | Performed by: FAMILY MEDICINE

## 2025-03-12 RX ORDER — CEPHALEXIN 500 MG/1
500 CAPSULE ORAL 2 TIMES DAILY
COMMUNITY
Start: 2025-03-10

## 2025-03-12 RX ORDER — PHENAZOPYRIDINE HYDROCHLORIDE 200 MG/1
200 TABLET, FILM COATED ORAL 3 TIMES DAILY PRN
Qty: 6 TABLET | Refills: 0 | Status: SHIPPED | OUTPATIENT
Start: 2025-03-12 | End: 2025-03-14

## 2025-03-12 NOTE — ASSESSMENT & PLAN NOTE
Continue treatment plan at this time.  Let us know if the culture returned negative or positive or if more clots seen.

## 2025-03-12 NOTE — PROGRESS NOTES
"  Family Medicine    Patient ID: [id number]     Chief Complaint: Urinary Tract Infection (Went to  yesterday due to blood in urine )      HPI:     Pema Ruiz is a 79 y.o. female here today for concern of gross hematuria. Was seen at  yesterday and started keflex.  On and off gross hematuria, one episode this am, no fever, no flank pain, no urinary frequency.     Past Medical History:   Diagnosis Date    Basal cell carcinoma     Dementia     Hypothyroidism, unspecified     Mixed hyperlipidemia     Osteoporosis     Paroxysmal atrial fibrillation         Past Surgical History:   Procedure Laterality Date    APPENDECTOMY      BREAST SURGERY      Lump re.oval on left breast    EYE SURGERY      Cadarac surgery    FRACTURE SURGERY      Right shoulder break    HYSTERECTOMY      TONSILLECTOMY          Social History     Tobacco Use    Smoking status: Never     Passive exposure: Never    Smokeless tobacco: Never   Substance and Sexual Activity    Alcohol use: Not Currently    Drug use: Not Currently    Sexual activity: Not Currently     Partners: Male        Current Outpatient Medications   Medication Instructions    cephALEXin (KEFLEX) 500 mg, 2 times daily    diltiaZEM (CARDIZEM) 120 mg    liothyronine (CYTOMEL) 5 mcg, Oral    metoprolol succinate (TOPROL-XL) 50 mg, Daily    phenazopyridine (PYRIDIUM) 200 mg, Oral, 3 times daily PRN    traZODone (DESYREL) 50 mg, Oral, Nightly    XARELTO 20 mg, Daily       Review of patient's allergies indicates:  No Known Allergies     Patient Care Team:  Eunice Bagley MD as PCP - General (Family Medicine)  Aubrey Olivas MD as Consulting Physician (Cardiovascular Disease)     Subjective:     Review of Systems    12 point review of systems conducted, negative except as stated in the history of present illness. See HPI for details.    Objective:     Visit Vitals  BP 97/62   Pulse (!) 59   Ht 5' 4" (1.626 m)   Wt 52.5 kg (115 lb 12.8 oz)   SpO2 100%   BMI 19.88 kg/m² " "      Physical Exam  Vitals and nursing note reviewed.   Constitutional:       Appearance: Normal appearance.   HENT:      Mouth/Throat:      Mouth: Mucous membranes are moist.   Cardiovascular:      Rate and Rhythm: Normal rate and regular rhythm.   Pulmonary:      Effort: Pulmonary effort is normal.   Abdominal:      General: There is no distension.      Tenderness: There is no abdominal tenderness. There is no right CVA tenderness, left CVA tenderness, guarding or rebound.   Skin:     General: Skin is warm.   Neurological:      General: No focal deficit present.      Mental Status: She is alert.   Psychiatric:         Mood and Affect: Mood normal.         Labs Reviewed:     Chemistry:  Lab Results   Component Value Date     01/08/2025    K 4.3 01/08/2025    BUN 17.3 01/08/2025    CREATININE 0.84 01/08/2025    EGFRNORACEVR >60 01/08/2025    GLUCOSE 92 01/08/2025    CALCIUM 9.2 01/08/2025    ALKPHOS 67 01/08/2025    LABPROT 6.6 01/08/2025    ALBUMIN 3.9 01/08/2025    AST 29 01/08/2025    ALT 21 01/08/2025    TSH 1.867 01/08/2025        No results found for: "HGBA1C", "MICROALBCREA"     Hematology:  Lab Results   Component Value Date    WBC 4.23 (L) 01/08/2025    HGB 12.5 01/08/2025    HCT 39.3 01/08/2025     01/08/2025       Lipid Panel:  Lab Results   Component Value Date    CHOL 182 01/08/2025    HDL 87 (H) 01/08/2025    LDL 82.00 01/08/2025    TRIG 64 01/08/2025    TOTALCHOLEST 2 01/08/2025        Urine:  Lab Results   Component Value Date    APPEARANCEUA Clear 01/08/2025    SGUA 1.018 01/08/2025    PROTEINUA Trace (A) 01/08/2025    KETONESUA Negative 01/08/2025    LEUKOCYTESUR Negative 01/08/2025    RBCUA 0-5 01/08/2025    WBCUA 0-5 01/08/2025    BACTERIA None Seen 01/08/2025    SQEPUA Trace 01/08/2025    HYALINECASTS 3-5 (A) 01/08/2025        Assessment:       ICD-10-CM ICD-9-CM   1. Gross hematuria  R31.0 599.71        Plan:     1. Gross hematuria  Assessment & Plan:  Continue treatment plan at " this time.  Let us know if the culture returned negative or positive or if more clots seen.      Orders:  -     phenazopyridine (PYRIDIUM) 200 MG tablet; Take 1 tablet (200 mg total) by mouth 3 (three) times daily as needed for Pain.  Dispense: 6 tablet; Refill: 0         No follow-ups on file. In addition to their scheduled follow up, the patient has also been instructed to follow up on as needed basis.     Future Appointments   Date Time Provider Department Center   1/15/2026 10:00 AM Eunice Bagley MD [facility] Olegario Bagley MD

## 2025-03-18 ENCOUNTER — TELEPHONE (OUTPATIENT)
Dept: PRIMARY CARE CLINIC | Facility: CLINIC | Age: 80
End: 2025-03-18
Payer: MEDICARE

## 2025-03-18 DIAGNOSIS — R31.0 GROSS HEMATURIA: Primary | ICD-10-CM

## 2025-03-18 NOTE — TELEPHONE ENCOUNTER
----- Message from Eunice Bagley MD sent at 3/18/2025  8:26 AM CDT -----  Regarding: RE: advice  A referral to Urology was placed.  ----- Message -----  From: Joe Olmos LPN  Sent: 3/18/2025   7:42 AM CDT  To: Eunice Bagley MD  Subject: FW: advice                                         Pt finish all her medication and states still having all the same symptoms from last week. Would like to be referred to urology.  ----- Message -----  From: Carmita Quijano  Sent: 3/17/2025  10:57 AM CDT  To: Kevyn Dawson Staff  Subject: advice                                           Who Called: Pema Damian is requesting assistance/information from provider's office.Symptoms (please be specific):  How long has patient had these symptoms:  List of preferred pharmacies on file (remove unneeded): [unfilled]If different, enter pharmacy into here including location and phone number: Preferred Method of Contact: Phone CallPatient's Preferred Phone Number on File: [phone] Best Call Back Number, if different:Additional Information: Pt wants the nurse to call her about getting a referral with a urologist.

## 2025-03-19 ENCOUNTER — OFFICE VISIT (OUTPATIENT)
Dept: UROLOGY | Facility: CLINIC | Age: 80
End: 2025-03-19
Payer: MEDICARE

## 2025-03-19 VITALS
WEIGHT: 115 LBS | RESPIRATION RATE: 20 BRPM | BODY MASS INDEX: 19.63 KG/M2 | SYSTOLIC BLOOD PRESSURE: 96 MMHG | OXYGEN SATURATION: 100 % | HEART RATE: 61 BPM | DIASTOLIC BLOOD PRESSURE: 54 MMHG | HEIGHT: 64 IN | TEMPERATURE: 98 F

## 2025-03-19 DIAGNOSIS — N39.0 RECURRENT UTI: Primary | ICD-10-CM

## 2025-03-19 DIAGNOSIS — R31.0 GROSS HEMATURIA: ICD-10-CM

## 2025-03-19 PROCEDURE — 99214 OFFICE O/P EST MOD 30 MIN: CPT | Mod: PBBFAC | Performed by: NURSE PRACTITIONER

## 2025-03-19 NOTE — PROGRESS NOTES
Chief Complaint: No chief complaint on file.      HPI:   Patient is a 79-year-old female referred to Urology for gross hematuria.  Patient seen by PCP on 03/18/2025 gross hematuria.  Today patient presents with persistent gross hematuria she received a Rocephin injection and also Omnicef 300 mg p.o. b.i.d. times 10 days however patient is still having gross hematuria.  In reviewing patient's records a culture and sensitivity was never sent to lab.  Therefore she will drop off a urine tomorrow and I will run for culture and notify patient when completed.  Scheduled for a virtual visit in 1 week to determine course of action.  It was a possibility patient UTI was not covered with antibiotic if so we will give the appropriate coverage if there was no bacteria present and will schedule patient for a cystoscope stat.    Allergies:  Review of patient's allergies indicates:  No Known Allergies    Medications:  Current Medications[1]    Review of Systems:  General: No fever, chills, fatigability, or weight loss.  Skin: No rashes, itching, or changes in color or texture of skin.  Chest: Denies SIMS, cyanosis, wheezing, cough, and sputum production.  Abdomen: Appetite fine. No weight loss. Denies diarrhea, abdominal pain, hematemesis, or blood in stool.  Musculoskeletal: No joint stiffness or swelling. Denies back pain.  : As above.  All other review of systems negative.    PMH:  Past Medical History:   Diagnosis Date    Basal cell carcinoma     Dementia     Hypothyroidism, unspecified     Mixed hyperlipidemia     Osteoporosis     Paroxysmal atrial fibrillation        PSH:  Past Surgical History:   Procedure Laterality Date    APPENDECTOMY      BREAST SURGERY      Lump re.oval on left breast    EYE SURGERY      Cadarac surgery    FRACTURE SURGERY      Right shoulder break    HYSTERECTOMY      TONSILLECTOMY         FamHx:  Family History   Problem Relation Name Age of Onset    Alzheimer's disease Mother      Heart disease  Father Ari Valenzuela        SocHx:  Social History[2]    Physical Exam:  Vitals:    03/19/25 1507   Resp: 20     General: A&Ox3, no apparent distress, no deformities  Neck: No masses, normal thyroid  Lungs: CTA vanessa, no use of accessory muscles  Heart: RRR, no arrhythmias  Abdomen: Soft, NT, ND, no masses, no hernias, no hepatosplenomegaly  Lymphatic: Neck and groin nodes negative  Skin: The skin is warm and dry. No jaundice.  Ext: No c/c/e.        Impression:  1. Gross hematuria  - Ambulatory referral/consult to Urology  - POCT URINE DIPSTICK WITH MICROSCOPE, AUTOMATED      Plan:  Instructed patient provide a urine to be evaluated for a culture if positive will treat accordingly if not will schedule patient for a stat cystoscope.  RTC 1 week virtual visit  Instructed patient if develops any abnormal urologic symptoms notify clinic to be re-evaluate treated or during after hours go to emergency room versus urgent here.                           [facility]         [1]   Current Outpatient Medications   Medication Sig Dispense Refill    diltiaZEM (CARDIZEM) 120 MG tablet Take 120 mg by mouth.      liothyronine (CYTOMEL) 5 MCG Tab TAKE 1 TABLET BY MOUTH EVERY DAY FOR 90 DAYS 90 tablet 1    metoprolol succinate (TOPROL-XL) 50 MG 24 hr tablet Take 50 mg by mouth once daily.      traZODone (DESYREL) 50 MG tablet Take 1 tablet (50 mg total) by mouth every evening. 90 tablet 3    XARELTO 20 mg Tab Take 20 mg by mouth once daily.      cephALEXin (KEFLEX) 500 MG capsule Take 500 mg by mouth 2 (two) times daily. (Patient not taking: Reported on 3/19/2025)       No current facility-administered medications for this visit.   [2]   Social History  Socioeconomic History    Marital status:    Tobacco Use    Smoking status: Never     Passive exposure: Never    Smokeless tobacco: Never   Substance and Sexual Activity    Alcohol use: Not Currently    Drug use: Not Currently    Sexual activity: Not Currently     Partners: Male     Social  Drivers of Health     Financial Resource Strain: Low Risk  (3/11/2025)    Overall Financial Resource Strain (CARDIA)     Difficulty of Paying Living Expenses: Not very hard   Food Insecurity: No Food Insecurity (3/11/2025)    Hunger Vital Sign     Worried About Running Out of Food in the Last Year: Never true     Ran Out of Food in the Last Year: Never true   Transportation Needs: No Transportation Needs (3/11/2025)    PRAPARE - Transportation     Lack of Transportation (Medical): No     Lack of Transportation (Non-Medical): No   Physical Activity: Insufficiently Active (3/11/2025)    Exercise Vital Sign     Days of Exercise per Week: 1 day     Minutes of Exercise per Session: 10 min   Stress: No Stress Concern Present (3/11/2025)    [employer] - Occupational Stress Questionnaire     Feeling of Stress : Only a little   Housing Stability: Low Risk  (3/11/2025)    Housing Stability Vital Sign     Unable to Pay for Housing in the Last Year: No     Number of Times Moved in the Last Year: 0     Homeless in the Last Year: No

## 2025-03-19 NOTE — PROGRESS NOTES
Placed in room. Seen by Garrison Pham NP. Spoke with patient. Has blood present in the urine that is submitted.  F/U in 1 week.

## 2025-03-20 ENCOUNTER — LAB VISIT (OUTPATIENT)
Dept: LAB | Facility: HOSPITAL | Age: 80
End: 2025-03-20
Attending: NURSE PRACTITIONER
Payer: MEDICARE

## 2025-03-20 DIAGNOSIS — R31.0 GROSS HEMATURIA: ICD-10-CM

## 2025-03-20 PROCEDURE — 87086 URINE CULTURE/COLONY COUNT: CPT

## 2025-03-22 LAB — BACTERIA UR CULT: NO GROWTH

## 2025-03-24 ENCOUNTER — HOSPITAL ENCOUNTER (INPATIENT)
Facility: HOSPITAL | Age: 80
LOS: 2 days | Discharge: HOME | DRG: 700 | End: 2025-03-26
Attending: INTERNAL MEDICINE | Admitting: INTERNAL MEDICINE
Payer: MEDICARE

## 2025-03-24 ENCOUNTER — HOSPITAL ENCOUNTER (EMERGENCY)
Facility: HOSPITAL | Age: 80
Discharge: ANOTHER HOSPITAL | End: 2025-03-24
Attending: INTERNAL MEDICINE
Payer: MEDICARE

## 2025-03-24 ENCOUNTER — TELEPHONE (OUTPATIENT)
Dept: UROLOGY | Facility: CLINIC | Age: 80
End: 2025-03-24
Payer: MEDICARE

## 2025-03-24 VITALS
SYSTOLIC BLOOD PRESSURE: 116 MMHG | HEART RATE: 64 BPM | OXYGEN SATURATION: 96 % | HEIGHT: 63 IN | TEMPERATURE: 99 F | RESPIRATION RATE: 18 BRPM | BODY MASS INDEX: 20.39 KG/M2 | WEIGHT: 115.06 LBS | DIASTOLIC BLOOD PRESSURE: 43 MMHG

## 2025-03-24 DIAGNOSIS — R07.9 CHEST PAIN: ICD-10-CM

## 2025-03-24 DIAGNOSIS — R68.89 SIGNS AND SYMPTOMS OF ANEMIA: ICD-10-CM

## 2025-03-24 DIAGNOSIS — R31.9 HEMATURIA: ICD-10-CM

## 2025-03-24 DIAGNOSIS — N32.89 BLADDER MASS: Primary | ICD-10-CM

## 2025-03-24 DIAGNOSIS — R31.9 HEMATURIA, UNSPECIFIED TYPE: ICD-10-CM

## 2025-03-24 LAB
ABO + RH BLD: NORMAL
ABORH RETYPE: NORMAL
ALBUMIN SERPL-MCNC: 3.3 G/DL (ref 3.4–4.8)
ALBUMIN/GLOB SERPL: 1.3 RATIO (ref 1.1–2)
ALP SERPL-CCNC: 59 UNIT/L (ref 40–150)
ALT SERPL-CCNC: 28 UNIT/L (ref 0–55)
ANION GAP SERPL CALC-SCNC: 8 MEQ/L
AST SERPL-CCNC: 26 UNIT/L (ref 11–45)
BACTERIA #/AREA URNS AUTO: ABNORMAL /HPF
BASOPHILS # BLD AUTO: 0.03 X10(3)/MCL
BASOPHILS NFR BLD AUTO: 0.7 %
BILIRUB SERPL-MCNC: 0.4 MG/DL
BILIRUB UR QL STRIP.AUTO: ABNORMAL
BLD PROD TYP BPU: NORMAL
BLOOD UNIT EXPIRATION DATE: NORMAL
BLOOD UNIT TYPE CODE: 5100
BUN SERPL-MCNC: 16.6 MG/DL (ref 9.8–20.1)
CALCIUM SERPL-MCNC: 9.3 MG/DL (ref 8.4–10.2)
CHLORIDE SERPL-SCNC: 106 MMOL/L (ref 98–107)
CLARITY UR: ABNORMAL
CO2 SERPL-SCNC: 26 MMOL/L (ref 23–31)
COLOR UR AUTO: ABNORMAL
CREAT SERPL-MCNC: 1.12 MG/DL (ref 0.55–1.02)
CREAT/UREA NIT SERPL: 15
CROSSMATCH INTERPRETATION: NORMAL
DISPENSE STATUS: NORMAL
EOSINOPHIL # BLD AUTO: 0.08 X10(3)/MCL (ref 0–0.9)
EOSINOPHIL NFR BLD AUTO: 1.9 %
ERYTHROCYTE [DISTWIDTH] IN BLOOD BY AUTOMATED COUNT: 14 % (ref 11.5–17)
GFR SERPLBLD CREATININE-BSD FMLA CKD-EPI: 50 ML/MIN/1.73/M2
GLOBULIN SER-MCNC: 2.6 GM/DL (ref 2.4–3.5)
GLUCOSE SERPL-MCNC: 97 MG/DL (ref 82–115)
GLUCOSE UR QL STRIP: ABNORMAL
GROUP & RH: NORMAL
HCT VFR BLD AUTO: 23.1 % (ref 37–47)
HGB BLD-MCNC: 7.4 G/DL (ref 12–16)
HGB UR QL STRIP: ABNORMAL
HOLD SPECIMEN: NORMAL
IMM GRANULOCYTES # BLD AUTO: 0.02 X10(3)/MCL (ref 0–0.04)
IMM GRANULOCYTES NFR BLD AUTO: 0.5 %
INDIRECT COOMBS: NORMAL
KETONES UR QL STRIP: ABNORMAL
LEUKOCYTE ESTERASE UR QL STRIP: ABNORMAL
LYMPHOCYTES # BLD AUTO: 1 X10(3)/MCL (ref 0.6–4.6)
LYMPHOCYTES NFR BLD AUTO: 23.7 %
MCH RBC QN AUTO: 30.6 PG (ref 27–31)
MCHC RBC AUTO-ENTMCNC: 32 G/DL (ref 33–36)
MCV RBC AUTO: 95.5 FL (ref 80–94)
MONOCYTES # BLD AUTO: 0.4 X10(3)/MCL (ref 0.1–1.3)
MONOCYTES NFR BLD AUTO: 9.5 %
NEUTROPHILS # BLD AUTO: 2.69 X10(3)/MCL (ref 2.1–9.2)
NEUTROPHILS NFR BLD AUTO: 63.7 %
NITRITE UR QL STRIP: ABNORMAL
NRBC BLD AUTO-RTO: 0 %
PH UR STRIP: ABNORMAL [PH]
PLATELET # BLD AUTO: 266 X10(3)/MCL (ref 130–400)
PMV BLD AUTO: 9.6 FL (ref 7.4–10.4)
POTASSIUM SERPL-SCNC: 3.9 MMOL/L (ref 3.5–5.1)
PROT SERPL-MCNC: 5.9 GM/DL (ref 5.8–7.6)
PROT UR QL STRIP: ABNORMAL
RBC # BLD AUTO: 2.42 X10(6)/MCL (ref 4.2–5.4)
RBC #/AREA URNS AUTO: >100 /HPF
SODIUM SERPL-SCNC: 140 MMOL/L (ref 136–145)
SP GR UR STRIP.AUTO: ABNORMAL
SPECIMEN OUTDATE: NORMAL
SQUAMOUS #/AREA URNS LPF: ABNORMAL /HPF
UNIT NUMBER: NORMAL
UROBILINOGEN UR STRIP-ACNC: ABNORMAL
WBC # BLD AUTO: 4.22 X10(3)/MCL (ref 4.5–11.5)
WBC #/AREA URNS AUTO: ABNORMAL /HPF

## 2025-03-24 PROCEDURE — 86901 BLOOD TYPING SEROLOGIC RH(D): CPT | Performed by: INTERNAL MEDICINE

## 2025-03-24 PROCEDURE — 80053 COMPREHEN METABOLIC PANEL: CPT | Performed by: INTERNAL MEDICINE

## 2025-03-24 PROCEDURE — 85025 COMPLETE CBC W/AUTO DIFF WBC: CPT | Performed by: INTERNAL MEDICINE

## 2025-03-24 PROCEDURE — 25000003 PHARM REV CODE 250: Performed by: INTERNAL MEDICINE

## 2025-03-24 PROCEDURE — 99285 EMERGENCY DEPT VISIT HI MDM: CPT | Mod: 25

## 2025-03-24 PROCEDURE — 87086 URINE CULTURE/COLONY COUNT: CPT | Performed by: INTERNAL MEDICINE

## 2025-03-24 PROCEDURE — 11000001 HC ACUTE MED/SURG PRIVATE ROOM

## 2025-03-24 PROCEDURE — 86920 COMPATIBILITY TEST SPIN: CPT | Performed by: INTERNAL MEDICINE

## 2025-03-24 PROCEDURE — 81015 MICROSCOPIC EXAM OF URINE: CPT | Performed by: INTERNAL MEDICINE

## 2025-03-24 PROCEDURE — P9016 RBC LEUKOCYTES REDUCED: HCPCS | Performed by: INTERNAL MEDICINE

## 2025-03-24 PROCEDURE — 36430 TRANSFUSION BLD/BLD COMPNT: CPT

## 2025-03-24 RX ORDER — HYDROCODONE BITARTRATE AND ACETAMINOPHEN 500; 5 MG/1; MG/1
TABLET ORAL
Status: DISCONTINUED | OUTPATIENT
Start: 2025-03-24 | End: 2025-03-24 | Stop reason: HOSPADM

## 2025-03-24 RX ADMIN — SODIUM CHLORIDE: 0.9 INJECTION, SOLUTION INTRAVENOUS at 03:03

## 2025-03-24 NOTE — ED PROVIDER NOTES
Encounter Date: 3/24/2025       History     Chief Complaint   Patient presents with    Hematuria     Patient reports hematuria and weakness since 3/9/25. Denies abdominal pain or dysuria. Patient states that she seen urology on 3/19/25  and urine came back negative.      Presents to ED with hematuria. States noticed blood in her urine since beginning of this month. Evaluated at Urology clinic and have a follow up this week. Hx of A Fib in [facility], did not took her anticoagulant today. Denies pain, dysuria, nausea or fever    The history is provided by the patient and the spouse.     Review of patient's allergies indicates:  No Known Allergies  Past Medical History:   Diagnosis Date    Basal cell carcinoma     Dementia     Hypothyroidism, unspecified     Mixed hyperlipidemia     Osteoporosis     Paroxysmal atrial fibrillation      Past Surgical History:   Procedure Laterality Date    APPENDECTOMY      BREAST SURGERY      Lump re.oval on left breast    EYE SURGERY      Cadarac surgery    FRACTURE SURGERY      Right shoulder break    HYSTERECTOMY      TONSILLECTOMY       Family History   Problem Relation Name Age of Onset    Alzheimer's disease Mother      Heart disease Father Ari Valenzuela      Social History[1]  Review of Systems   Genitourinary:  Positive for hematuria.       Physical Exam     Initial Vitals [03/24/25 1037]   BP Pulse Resp Temp SpO2   102/65 (!) 52 18 98 °F (36.7 °C) 98 %      MAP       --         Physical Exam    Nursing note and vitals reviewed.  Constitutional: She appears well-developed. No distress.   HENT:   Head: Normocephalic and atraumatic. Mouth/Throat: Oropharynx is clear and moist.   Eyes: EOM are normal. Pupils are equal, round, and reactive to light.   Pale conjunctiva   Neck: Neck supple. No JVD present.   Normal range of motion.  Cardiovascular:  Regular rhythm and intact distal pulses.           Murmur (Grade 3) heard.  Bradycardic   Pulmonary/Chest: Breath sounds normal.   Abdominal:  Abdomen is soft. Bowel sounds are normal. She exhibits no distension. There is no abdominal tenderness. There is no rebound and no guarding.   Musculoskeletal:         General: No edema. Normal range of motion.      Cervical back: Normal range of motion and neck supple.     Neurological: She is alert and oriented to person, place, and time. She has normal strength. GCS score is 15. GCS eye subscore is 4. GCS verbal subscore is 5. GCS motor subscore is 6.   Skin: Skin is warm and dry. No rash noted. There is pallor.   Psychiatric: Her behavior is normal. Thought content normal.         ED Course   Critical Care    Date/Time: 3/24/2025 2:28 PM    Performed by: Panchito Hanley MD  Authorized by: Panchito Hanley MD  Direct patient critical care time: 12 minutes  Additional history critical care time: 5 minutes  Ordering / reviewing critical care time: 12 minutes  Documentation critical care time: 5 minutes  Consulting other physicians critical care time: 5 minutes  Total critical care time (exclusive of procedural time) : 39 minutes  Critical care was necessary to treat or prevent imminent or life-threatening deterioration of the following conditions: circulatory failure.  Critical care was time spent personally by me on the following activities: development of treatment plan with patient or surrogate, discussions with consultants, interpretation of cardiac output measurements, evaluation of patient's response to treatment, examination of patient, obtaining history from patient or surrogate, ordering and performing treatments and interventions, ordering and review of laboratory studies, ordering and review of radiographic studies, re-evaluation of patient's condition and review of old charts.        Labs Reviewed   COMPREHENSIVE METABOLIC PANEL - Abnormal       Result Value    Sodium 140      Potassium 3.9      Chloride 106      CO2 26      Glucose 97      Blood Urea Nitrogen 16.6       Creatinine 1.12 (*)     Calcium 9.3      Protein Total 5.9      Albumin 3.3 (*)     Globulin 2.6      Albumin/Globulin Ratio 1.3      Bilirubin Total 0.4      ALP 59      ALT 28      AST 26      eGFR 50      Anion Gap 8.0      BUN/Creatinine Ratio 15     URINALYSIS, REFLEX TO URINE CULTURE - Abnormal    Color, UA Red-brown (*)     Appearance, UA Extra Turbid (*)     Specific Gravity, UA        pH, UA Color may interfere with results      Protein, UA Color may interfere with results      Glucose, UA Color may interfere with results      Ketones, UA Color may interfere with results      Blood, UA Color may interfere with results      Bilirubin, UA Color may interfere with results      Urobilinogen, UA Color may interfere with results      Nitrites, UA Color may interfere with results      Leukocyte Esterase, UA Color may interfere with results      RBC, UA >100 (*)     WBC, UA 11-20 (*)     Bacteria, UA None Seen      Squamous Epithelial Cells, UA None Seen     CBC WITH DIFFERENTIAL - Abnormal    WBC 4.22 (*)     RBC 2.42 (*)     Hgb 7.4 (*)     Hct 23.1 (*)     MCV 95.5 (*)     MCH 30.6      MCHC 32.0 (*)     RDW 14.0      Platelet 266      MPV 9.6      Neut % 63.7      Lymph % 23.7      Mono % 9.5      Eos % 1.9      Basophil % 0.7      Imm Grans % 0.5      Neut # 2.69      Lymph # 1.00      Mono # 0.40      Eos # 0.08      Baso # 0.03      Imm Gran # 0.02      NRBC% 0.0     CULTURE, URINE   CBC W/ AUTO DIFFERENTIAL    Narrative:     The following orders were created for panel order CBC auto differential.  Procedure                               Abnormality         Status                     ---------                               -----------         ------                     CBC with Differential[[id number]]       Abnormal            Final result                 Please view results for these tests on the individual orders.   EXTRA TUBES    Narrative:     The following orders were created for panel order EXTRA  TUBES.  Procedure                               Abnormality         Status                     ---------                               -----------         ------                     Light Blue Top Hold[[id number]]                             Final result               Gold Top Hold[[id number]]                                   Final result               Pink Top Hold[[id number]]                                   Final result                 Please view results for these tests on the individual orders.   LIGHT BLUE TOP HOLD    Extra Tube Hold for add-ons.     GOLD TOP HOLD    Extra Tube Hold for add-ons.     PINK TOP HOLD    Extra Tube Hold for add-ons.     EXTRA TUBES    Narrative:     The following orders were created for panel order EXTRA TUBES.  Procedure                               Abnormality         Status                     ---------                               -----------         ------                     Lavender Top Hold[[id number]]                               Final result                 Please view results for these tests on the individual orders.   LAVENDER TOP HOLD    Extra Tube Hold for add-ons.     TYPE & SCREEN    Group & Rh O POS      Indirect Jarad GEL NEG      Specimen Outdate 03/27/2025 23:59     ABORH RETYPE    ABORH Retype O POS     PREPARE RBC SOFT    UNIT NUMBER [id number]      UNIT ABO/RH O POS      DISPENSE STATUS Issued      Unit Expiration [id number]      Product Code [id number]      Unit Blood Type Code 5100      CROSSMATCH INTERPRETATION Compatible            Imaging Results              CT Abdomen Pelvis  Without Contrast (Final result)  Result time 03/24/25 14:02:47      Final result by Chip Bolton MD (03/24/25 14:02:47)                   Impression:      1. Nodular area in the bladder is indeterminate with neoplasm possible.  Recommend correlation with cystoscopy.  2. Otherwise no acute abdominopelvic findings on this noncontrast scan.      Electronically signed  by: Chip Che  Date:    03/24/2025  Time:    14:02               Narrative:    EXAMINATION:  CT ABDOMEN PELVIS WITHOUT CONTRAST    CLINICAL HISTORY:  Hematuria.Flank pain, kidney stone suspected;    TECHNIQUE:  Helical acquisition through the abdomen and pelvis without IV contrast.  Lack of contrast limits evaluation of solid organs and vascular structures .  Three plane reconstructions were provided for review.  mGycm. Automatic exposure control, adjustment of mA/kV or iterative reconstruction technique was used to reduce radiation.    COMPARISON:  No prior CT    FINDINGS:  The limited imaged lung bases are clear.    No acute findings noncontrast liver, gallbladder, spleen, pancreas or adrenals.    No hydronephrosis.  No renal calculi are seen.    No bowel obstruction. No significant inflammatory changes of the bowel.  Normal appendix.  No free air.    A nodular area is noted dependently in the bladder on image 111 series 2.  No pelvic free fluid.  Abdominal aorta normal in caliber.  Mild atherosclerotic disease.    There are moderate degenerative changes of the spine.                                       Medications   0.9%  NaCl infusion (for blood administration) ( Intravenous New Bag 3/24/25 1520)     Medical Decision Making  Amount and/or Complexity of Data Reviewed  Labs: ordered. Decision-making details documented in ED Course.  Radiology: ordered and independent interpretation performed. Decision-making details documented in ED Course.    Risk  Prescription drug management.      Additional MDM:   Differential Diagnosis:   Other: The following diagnoses were also considered and will be evaluated: Cystitis, Kidney tone and Malignancy.                        Pt Pema Ruiz have a diagnosis of Bladder mass with hematuria, symptomatic anemia requires evaluation and management by Urology service. At this facility we do not have Urology capability (at this time ) for which will need to transfer  to a facility with capability and capacity. Pt was informed about his condition and the recommendation of transfer for specialty care, Pt understood and agrees with transfer recommendation. This case was discuss with Dr. TOSHA Santamaria at [facility] who accepted the patient for transfer and assures capacity and capability for this emergency department case. Pt will be transfer by ([facility]) by  ground using an ACLS unit. Treatment in route will be saline lock, PRBC transfusion.  The risk of transfer are Motor Vehicle Accident, Respiratory Failure, Cardiac Dysrhythmias, Progression of Bleeding, or Death.  The benefits of the transfer are adequate evaluation and management by a specialist in the area of urology.  At this time Pt is stable for transfer.              Clinical Impression:  Final diagnoses:  [N32.89] Bladder mass (Primary)  [R31.9] Hematuria, unspecified type  [R68.89] Signs and symptoms of anemia          ED Disposition Condition    Transfer to Another Facility Panchito Retana MD  03/24/25 4169         [1]   Social History  Tobacco Use    Smoking status: Never     Passive exposure: Never    Smokeless tobacco: Never   Substance Use Topics    Alcohol use: Not Currently    Drug use: Not Currently        Panchito Hanley MD  03/24/25 9799

## 2025-03-24 NOTE — TELEPHONE ENCOUNTER
Spoke to patient's  this morning through Albert FISHER regarding shortness of breath, leg weakness, hematuria while on the toilet therefore instructed Albert to tell patient family member to take her to the emergency room to get imaging due to patient has a recent history of UTIs and gross hematuria and will pending a urine culture which was negative.  This was to get the patient a stat CTs therefore we can scheduled for a cystoscope in the future as listed on my note previously.

## 2025-03-25 LAB
ALBUMIN SERPL-MCNC: 3 G/DL (ref 3.4–4.8)
ALBUMIN/GLOB SERPL: 1.6 RATIO (ref 1.1–2)
ALP SERPL-CCNC: 60 UNIT/L (ref 40–150)
ALT SERPL-CCNC: 23 UNIT/L (ref 0–55)
ANION GAP SERPL CALC-SCNC: 8 MEQ/L
APTT PPP: 25.3 SECONDS (ref 23.2–33.7)
AST SERPL-CCNC: 22 UNIT/L (ref 11–45)
BASOPHILS # BLD AUTO: 0.03 X10(3)/MCL
BASOPHILS NFR BLD AUTO: 0.7 %
BILIRUB SERPL-MCNC: 0.4 MG/DL
BUN SERPL-MCNC: 17.1 MG/DL (ref 9.8–20.1)
CALCIUM SERPL-MCNC: 8.4 MG/DL (ref 8.4–10.2)
CHLORIDE SERPL-SCNC: 109 MMOL/L (ref 98–107)
CO2 SERPL-SCNC: 24 MMOL/L (ref 23–31)
CREAT SERPL-MCNC: 0.82 MG/DL (ref 0.55–1.02)
CREAT/UREA NIT SERPL: 21
EOSINOPHIL # BLD AUTO: 0.06 X10(3)/MCL (ref 0–0.9)
EOSINOPHIL NFR BLD AUTO: 1.4 %
ERYTHROCYTE [DISTWIDTH] IN BLOOD BY AUTOMATED COUNT: 15.5 % (ref 11.5–17)
GFR SERPLBLD CREATININE-BSD FMLA CKD-EPI: >60 ML/MIN/1.73/M2
GLOBULIN SER-MCNC: 1.9 GM/DL (ref 2.4–3.5)
GLUCOSE SERPL-MCNC: 92 MG/DL (ref 82–115)
GROUP & RH: NORMAL
HCT VFR BLD AUTO: 22.5 % (ref 37–47)
HGB BLD-MCNC: 7.5 G/DL (ref 12–16)
IMM GRANULOCYTES # BLD AUTO: 0.01 X10(3)/MCL (ref 0–0.04)
IMM GRANULOCYTES NFR BLD AUTO: 0.2 %
INDIRECT COOMBS: NORMAL
INR PPP: 1
LYMPHOCYTES # BLD AUTO: 1.31 X10(3)/MCL (ref 0.6–4.6)
LYMPHOCYTES NFR BLD AUTO: 30 %
MAGNESIUM SERPL-MCNC: 1.8 MG/DL (ref 1.6–2.6)
MCH RBC QN AUTO: 29.6 PG (ref 27–31)
MCHC RBC AUTO-ENTMCNC: 33.3 G/DL (ref 33–36)
MCV RBC AUTO: 88.9 FL (ref 80–94)
MONOCYTES # BLD AUTO: 0.41 X10(3)/MCL (ref 0.1–1.3)
MONOCYTES NFR BLD AUTO: 9.4 %
NEUTROPHILS # BLD AUTO: 2.54 X10(3)/MCL (ref 2.1–9.2)
NEUTROPHILS NFR BLD AUTO: 58.3 %
NRBC BLD AUTO-RTO: 0 %
PLATELET # BLD AUTO: 224 X10(3)/MCL (ref 130–400)
PMV BLD AUTO: 9.8 FL (ref 7.4–10.4)
POTASSIUM SERPL-SCNC: 3.8 MMOL/L (ref 3.5–5.1)
PROT SERPL-MCNC: 4.9 GM/DL (ref 5.8–7.6)
PROTHROMBIN TIME: 13.7 SECONDS (ref 12.5–14.5)
RBC # BLD AUTO: 2.53 X10(6)/MCL (ref 4.2–5.4)
SODIUM SERPL-SCNC: 141 MMOL/L (ref 136–145)
SPECIMEN OUTDATE: NORMAL
WBC # BLD AUTO: 4.36 X10(3)/MCL (ref 4.5–11.5)

## 2025-03-25 PROCEDURE — 85730 THROMBOPLASTIN TIME PARTIAL: CPT

## 2025-03-25 PROCEDURE — 11000001 HC ACUTE MED/SURG PRIVATE ROOM

## 2025-03-25 PROCEDURE — 25000003 PHARM REV CODE 250: Performed by: INTERNAL MEDICINE

## 2025-03-25 PROCEDURE — 25500020 PHARM REV CODE 255: Performed by: INTERNAL MEDICINE

## 2025-03-25 PROCEDURE — 85610 PROTHROMBIN TIME: CPT

## 2025-03-25 PROCEDURE — 83735 ASSAY OF MAGNESIUM: CPT

## 2025-03-25 PROCEDURE — 85025 COMPLETE CBC W/AUTO DIFF WBC: CPT

## 2025-03-25 PROCEDURE — 25000003 PHARM REV CODE 250

## 2025-03-25 PROCEDURE — 80053 COMPREHEN METABOLIC PANEL: CPT

## 2025-03-25 PROCEDURE — 86901 BLOOD TYPING SEROLOGIC RH(D): CPT

## 2025-03-25 PROCEDURE — 36415 COLL VENOUS BLD VENIPUNCTURE: CPT

## 2025-03-25 RX ORDER — LIOTHYRONINE SODIUM 5 UG/1
5 TABLET ORAL DAILY
Status: DISCONTINUED | OUTPATIENT
Start: 2025-03-25 | End: 2025-03-26 | Stop reason: HOSPADM

## 2025-03-25 RX ORDER — IBUPROFEN 200 MG
24 TABLET ORAL
Status: DISCONTINUED | OUTPATIENT
Start: 2025-03-25 | End: 2025-03-26 | Stop reason: HOSPADM

## 2025-03-25 RX ORDER — CHOLECALCIFEROL (VITAMIN D3) 25 MCG
1000 TABLET ORAL DAILY
COMMUNITY

## 2025-03-25 RX ORDER — ATORVASTATIN CALCIUM 10 MG/1
10 TABLET, FILM COATED ORAL DAILY
COMMUNITY

## 2025-03-25 RX ORDER — ACETAMINOPHEN 325 MG/1
650 TABLET ORAL EVERY 4 HOURS PRN
Status: DISCONTINUED | OUTPATIENT
Start: 2025-03-25 | End: 2025-03-26 | Stop reason: HOSPADM

## 2025-03-25 RX ORDER — BISACODYL 10 MG/1
10 SUPPOSITORY RECTAL DAILY PRN
Status: DISCONTINUED | OUTPATIENT
Start: 2025-03-25 | End: 2025-03-26 | Stop reason: HOSPADM

## 2025-03-25 RX ORDER — IBUPROFEN 200 MG
16 TABLET ORAL
Status: DISCONTINUED | OUTPATIENT
Start: 2025-03-25 | End: 2025-03-26 | Stop reason: HOSPADM

## 2025-03-25 RX ORDER — ATORVASTATIN CALCIUM 10 MG/1
10 TABLET, FILM COATED ORAL DAILY
Status: DISCONTINUED | OUTPATIENT
Start: 2025-03-25 | End: 2025-03-26 | Stop reason: HOSPADM

## 2025-03-25 RX ORDER — SODIUM CHLORIDE 0.9 % (FLUSH) 0.9 %
10 SYRINGE (ML) INJECTION
Status: DISCONTINUED | OUTPATIENT
Start: 2025-03-25 | End: 2025-03-26 | Stop reason: HOSPADM

## 2025-03-25 RX ORDER — DICLOFENAC SODIUM 10 MG/G
2 GEL TOPICAL DAILY
Status: DISCONTINUED | OUTPATIENT
Start: 2025-03-25 | End: 2025-03-26 | Stop reason: HOSPADM

## 2025-03-25 RX ORDER — POLYETHYLENE GLYCOL 3350 17 G/17G
17 POWDER, FOR SOLUTION ORAL 2 TIMES DAILY PRN
Status: DISCONTINUED | OUTPATIENT
Start: 2025-03-25 | End: 2025-03-26 | Stop reason: HOSPADM

## 2025-03-25 RX ORDER — ALUMINUM HYDROXIDE, MAGNESIUM HYDROXIDE, AND SIMETHICONE 1200; 120; 1200 MG/30ML; MG/30ML; MG/30ML
30 SUSPENSION ORAL 4 TIMES DAILY PRN
Status: DISCONTINUED | OUTPATIENT
Start: 2025-03-25 | End: 2025-03-26 | Stop reason: HOSPADM

## 2025-03-25 RX ORDER — TALC
6 POWDER (GRAM) TOPICAL NIGHTLY PRN
Status: DISCONTINUED | OUTPATIENT
Start: 2025-03-25 | End: 2025-03-26 | Stop reason: HOSPADM

## 2025-03-25 RX ORDER — METOPROLOL SUCCINATE 50 MG/1
50 TABLET, EXTENDED RELEASE ORAL DAILY
Status: DISCONTINUED | OUTPATIENT
Start: 2025-03-25 | End: 2025-03-26

## 2025-03-25 RX ORDER — GLUCAGON 1 MG
1 KIT INJECTION
Status: DISCONTINUED | OUTPATIENT
Start: 2025-03-25 | End: 2025-03-26 | Stop reason: HOSPADM

## 2025-03-25 RX ORDER — TRAZODONE HYDROCHLORIDE 50 MG/1
50 TABLET ORAL NIGHTLY
Status: DISCONTINUED | OUTPATIENT
Start: 2025-03-25 | End: 2025-03-26 | Stop reason: HOSPADM

## 2025-03-25 RX ADMIN — METOPROLOL SUCCINATE 50 MG: 50 TABLET, EXTENDED RELEASE ORAL at 02:03

## 2025-03-25 RX ADMIN — ACETAMINOPHEN 650 MG: 325 TABLET ORAL at 02:03

## 2025-03-25 RX ADMIN — IOHEXOL 100 ML: 350 INJECTION, SOLUTION INTRAVENOUS at 11:03

## 2025-03-25 RX ADMIN — DICLOFENAC SODIUM 2 G: 10 GEL TOPICAL at 03:03

## 2025-03-25 RX ADMIN — LIOTHYRONINE SODIUM 5 MCG: 5 TABLET ORAL at 03:03

## 2025-03-25 RX ADMIN — ACETAMINOPHEN 650 MG: 325 TABLET ORAL at 10:03

## 2025-03-25 RX ADMIN — TRAZODONE HYDROCHLORIDE 50 MG: 50 TABLET ORAL at 09:03

## 2025-03-25 RX ADMIN — ATORVASTATIN CALCIUM 10 MG: 10 TABLET, FILM COATED ORAL at 09:03

## 2025-03-25 NOTE — H&P
[facility]  Hospital Medicine History & Physical Examination       Patient Name: Pema Ruiz  MRN: [id number]  Patient Class: IP- Inpatient   Admission Date: 3/24/2025   Admitting Physician: NIC Service   Length of Stay: 1  Attending Physician: Chantel Martin MD  Primary Care Provider: Eunice Bagley MD  Face-to-Face encounter date: 03/25/2025  Code Status:full  Chief Complaint: No chief complaint on file.      Screening for Social Drivers for health:  Patient screened for food insecurity, housing instability, transportation needs, utility difficulties, and interpersonal safety (select all that apply as identified as concern)  []Housing or Food  []Transportation Needs  []Utility Difficulties  []Interpersonal safety  [x]None      Patient information was obtained from patient, patient's family, past medical records and ER records.  ED records were reviewed in detail and documented below    HISTORY OF PRESENT ILLNESS:   Pema Ruiz is a 79 y.o. female who  has a past medical history of Basal cell carcinoma, Dementia, Hypothyroidism, unspecified, Mixed hyperlipidemia, Osteoporosis, and Paroxysmal atrial fibrillation.. The patient presented to [facility] on 3/24/2025 with a primary complaint of     79 old female with past medical history of AFib hypothyroidism, hyperlipidemia, dementia, basal cell carcinoma presented to ER today with hematuria patient reports it all started almost 1 month back she went to urgent care where she was told she has a UTI was prescribed antibiotics but patient's symptoms did not improve so went to her PCP who referred her to Urology but apparently she started having generalized weakness so she was instructed to go to ER where she had CT of the abdomen and pelvis done that showed nodular area in the bladder we showed greater than 100 RBC no bacteriuria hemoglobin of 7.5.  Patient was transferred to [facility] for neurology's services she has  been admitted to hospitalist service for further management and care  PAST MEDICAL HISTORY:     Past Medical History:   Diagnosis Date    Basal cell carcinoma     Dementia     Hypothyroidism, unspecified     Mixed hyperlipidemia     Osteoporosis     Paroxysmal atrial fibrillation        PAST SURGICAL HISTORY:     Past Surgical History:   Procedure Laterality Date    APPENDECTOMY      BREAST SURGERY      Lump re.oval on left breast    EYE SURGERY      Cadarac surgery    FRACTURE SURGERY      Right shoulder break    HYSTERECTOMY      TONSILLECTOMY         ALLERGIES:   Patient has no known allergies.    FAMILY HISTORY:   Reviewed and negative    SOCIAL HISTORY:     Social History     Tobacco Use    Smoking status: Never     Passive exposure: Never    Smokeless tobacco: Never   Substance Use Topics    Alcohol use: Not Currently        HOME MEDICATIONS:     Prior to Admission medications    Medication Sig Start Date End Date Taking? Authorizing Provider   cephALEXin (KEFLEX) 500 MG capsule Take 500 mg by mouth 2 (two) times daily.  Patient not taking: Reported on 3/19/2025 3/10/25   Provider, Historical   diltiaZEM (CARDIZEM) 120 MG tablet Take 120 mg by mouth. 3/17/24   Provider, Historical   liothyronine (CYTOMEL) 5 MCG Tab TAKE 1 TABLET BY MOUTH EVERY DAY FOR 90 DAYS 2/21/25 5/22/25  Eunice Bagley MD   metoprolol succinate (TOPROL-XL) 50 MG 24 hr tablet Take 50 mg by mouth once daily. 4/6/24   Provider, Historical   traZODone (DESYREL) 50 MG tablet Take 1 tablet (50 mg total) by mouth every evening. 1/13/25 1/13/26  Euncie Bagley MD   XARELTO 20 mg Tab Take 20 mg by mouth once daily. 3/14/24   Provider, Historical       REVIEW OF SYSTEMS:   Except as documented, all other systems reviewed and negative     PHYSICAL EXAM:     VITAL SIGNS: 24 HRS MIN & MAX LAST   Temp  Min: 97.8 °F (36.6 °C)  Max: 98.6 °F (37 °C) 98.6 °F (37 °C)   BP  Min: 93/45  Max: 134/54 111/68   Pulse  Min: 52  Max: 66  (!) 57   Resp  Min: 12   Max: 24 16   SpO2  Min: 87 %  Max: 100 % 97 %     General appearance: Well-developed, well-nourished female in no apparent distress.  HENT: Atraumatic head. Moist mucous membranes of oral cavity.  Eyes: Normal extraocular movements.   Neck: Supple.   Lungs: Clear to auscultation bilaterally. No wheezing present.   Heart: Regular rate and rhythm. S1 and S2 present with no murmurs/gallop/rub. No pedal edema. No JVD present.   Abdomen: Soft,   Extremities: No cyanosis, clubbing, or edema.  Skin: No Rash.   Neuro:  Awake and alert   Psych/mental status: Appropriate mood and affect. Responds appropriately to questions.     LABS AND IMAGING:     Recent Labs   Lab 03/24/25  1116 03/25/25  0354   WBC 4.22* 4.36*   RBC 2.42* 2.53*   HGB 7.4* 7.5*   HCT 23.1* 22.5*   MCV 95.5* 88.9   MCH 30.6 29.6   MCHC 32.0* 33.3   RDW 14.0 15.5    224   MPV 9.6 9.8       Recent Labs   Lab 03/24/25  1116 03/25/25  0354    141   K 3.9 3.8    109*   CO2 26 24   BUN 16.6 17.1   CREATININE 1.12* 0.82   CALCIUM 9.3 8.4   MG  --  1.80   ALBUMIN 3.3* 3.0*   ALKPHOS 59 60   ALT 28 23   AST 26 22   BILITOT 0.4 0.4       Microbiology Results (last 7 days)       ** No results found for the last 168 hours. **             CT Abdomen Pelvis  Without Contrast  Narrative: EXAMINATION:  CT ABDOMEN PELVIS WITHOUT CONTRAST    CLINICAL HISTORY:  Hematuria.Flank pain, kidney stone suspected;    TECHNIQUE:  Helical acquisition through the abdomen and pelvis without IV contrast.  Lack of contrast limits evaluation of solid organs and vascular structures .  Three plane reconstructions were provided for review.  mGycm. Automatic exposure control, adjustment of mA/kV or iterative reconstruction technique was used to reduce radiation.    COMPARISON:  No prior CT    FINDINGS:  The limited imaged lung bases are clear.    No acute findings noncontrast liver, gallbladder, spleen, pancreas or adrenals.    No hydronephrosis.  No renal calculi are  seen.    No bowel obstruction. No significant inflammatory changes of the bowel.  Normal appendix.  No free air.    A nodular area is noted dependently in the bladder on image 111 series 2.  No pelvic free fluid.  Abdominal aorta normal in caliber.  Mild atherosclerotic disease.    There are moderate degenerative changes of the spine.  Impression: 1. Nodular area in the bladder is indeterminate with neoplasm possible.  Recommend correlation with cystoscopy.  2. Otherwise no acute abdominopelvic findings on this noncontrast scan.    Electronically signed by: Chip Bolton  Date:    03/24/2025  Time:    14:02      ASSESSMENT & PLAN:     Hematuria   Nodular area in the bladder rule out cancer   History of hypothyroidism   Hyperlipidemia   Paroxysmal AFib but now bradycardic   Basal cell carcinoma   Dementia     Close watch on H&H  if it  drops to less than 7 will transfuse  All anticoagulation on hold, urology consulted  Plan for cystoscopy as per Urology  Hemoglobin of 7.5 today  Once heart rate is better then we will resume home dose of metoprolol  Will resume other home medication    Prophylaxis: SCD      VTE Prophylaxis: will be placed on Heparin/Lovenox/ Xarelto/ SCD for DVT prophylaxis and will be advised to be as mobile as possible and sit in a chair as tolerated    Patient condition:  Stable/Fair/Guarded/ Serious/ Critical    __________________________________________________________________________  INPATIENT LIST OF MEDICATIONS     Scheduled Meds:  Continuous Infusions:  PRN Meds:.  Current Facility-Administered Medications:     acetaminophen, 650 mg, Oral, Q4H PRN    aluminum-magnesium hydroxide-simethicone, 30 mL, Oral, QID PRN    bisacodyL, 10 mg, Rectal, Daily PRN    dextrose 50%, 12.5 g, Intravenous, PRN    dextrose 50%, 25 g, Intravenous, PRN    glucagon (human recombinant), 1 mg, Intramuscular, PRN    glucose, 16 g, Oral, PRN    glucose, 24 g, Oral, PRN    melatonin, 6 mg, Oral, Nightly PRN     polyethylene glycol, 17 g, Oral, BID PRN    sodium chloride 0.9%, 10 mL, Intravenous, PRN        03/25/2025    ________________________________________________________________________________      Discharge Planning and Disposition: No mobility needs. Ambulating well. Good social support system.   Anticipated discharge    If patient was admitted under observational status it is with my approval/permission.        All diagnosis and differential diagnosis have been reviewed; assessment and plan has been documented; I have personally reviewed the labs and test results that are presently available; I have reviewed the patients medication list; I have reviewed the consulting providers response and recommendations. I have reviewed or attempted to review medical records based upon their availability.    All of the patient and family questions have been addressed and answered. Patient's is agreeable to the above stated plan. I will continue to monitor closely and make adjustments to medical management as needed.    If patient was admitted under observational status it is with my approval/permission.      Chantel Martin MD   03/25/2025

## 2025-03-25 NOTE — PLAN OF CARE
03/25/25 1153   Discharge Assessment   Assessment Type Discharge Planning Assessment   Confirmed/corrected address, phone number and insurance Yes   Confirmed Demographics Correct on Facesheet   Source of Information patient;family   When was your last doctors appointment? 03/12/25   Communicated HARLEY with patient/caregiver Date not available/Unable to determine   Reason For Admission Hematuria   People in Home spouse   Do you expect to return to your current living situation? Yes   Do you have help at home or someone to help you manage your care at home? Yes   Who are your caregiver(s) and their phone number(s)? Daniel Ruiz   Prior to hospitilization cognitive status: Alert/Oriented   Current cognitive status: Alert/Oriented   Walking or Climbing Stairs Difficulty yes   Walking or Climbing Stairs ambulation difficulty, requires equipment   Mobility Management straight cane   Dressing/Bathing Difficulty no   Home Accessibility stairs to enter home   Number of Stairs, Main Entrance none   Stair Railings, Main Entrance none   Home Layout Able to live on 1st floor   Equipment Currently Used at Home cane, straight   Patient currently being followed by outpatient case management? No   Do you currently have service(s) that help you manage your care at home? No   Do you take prescription medications? Yes   Do you have prescription coverage? Yes   Do you have any problems affording any of your prescribed medications? No   Is the patient taking medications as prescribed? yes   Who is going to help you get home at discharge? Daniel Ruiz   How do you get to doctors appointments? car, drives self   Are you on dialysis? No   Do you take coumadin? No   Discharge Plan A Home   Discharge Plan B Home   DME Needed Upon Discharge  other (see comments)  (tbd)   Discharge Plan discussed with: Patient;Spouse/sig other   Name(s) and Number(s) Daniel Ruiz [phone]   Transition of Care Barriers None   OTHER   Name(s) of People in  Home Daniel Joseph     Assessment done with pt in bed and spouse at bedside. Pt had HH in the past set up by [facility] but not sure which company was used. Unable to find info in [employer]. Pt uses [facility]   at [facility] [address].

## 2025-03-25 NOTE — CONSULTS
Pema Ruiz 1945  [id number]  3/25/2025    CONSULTING PHYSICIAN: Becky Tovar NP    REASON FOR CONSULTATION: hematuria, new bladder mass    HPI:  The patient is an 79-year-old female with a past medical history of dementia, hyperlipidemia, hypothyroidism, atrial fibrillation on Xarelto, basal cell carcinoma.  She began with gross hematuria earlier this month.  She was seen at urgent care on 03/11/2025 and prescribed Keflex, denied fever, flank pain or urinary frequency at that time.  She completed a 10 day course of Omnicef prescribed by her PCP on 3/12/2025 without improvement and hematuria - no C&S obtained.  She was evaluated by Urology outpatient, urine culture repeated 03/20/2025 with no growth.  Yesterday she began with shortness of breath, weakness in addition to ongoing hematuria and was instructed to present to the emergency room further evaluation.  She is hemodynamically stable and afebrile.  Lab work this morning reveals WBC 4.36, H&H 7.5/22.5, BUN and creatinine 17.1/0.82; UA with turbid red brown urine, greater than 100 RBC, 11-20 WBC, no bacteria.  Urine culture with no growth thus far.  CT abdomen and pelvis reveals nodular area noted independently in the bladder, normal appearance of bilateral kidneys without hydronephrosis or calculi.    The patient is resting in bed.  Family is at bedside.  She denies abdominal or suprapubic pain, flank pain, dysuria, urinary frequency or difficulty emptying her bladder.  She reports she intermittently passes small clots, hematuria waxes and wanes since onset 03/09/2025.  She denies any prior urologic surgeries, denies frequent UTIs.  She denies a family history bladder cancer.  She has never smoked.    Past Medical History:   Diagnosis Date    Basal cell carcinoma     Dementia     Hypothyroidism, unspecified     Mixed hyperlipidemia     Osteoporosis     Paroxysmal atrial fibrillation      Past Surgical History:   Procedure Laterality Date     "APPENDECTOMY      BREAST SURGERY      Lump re.oval on left breast    EYE SURGERY      Cadarac surgery    FRACTURE SURGERY      Right shoulder break    HYSTERECTOMY      TONSILLECTOMY       Family History   Problem Relation Name Age of Onset    Alzheimer's disease Mother      Heart disease Father Ari Valenzuela      Social History[1]  Current Medications[2]  Review of patient's allergies indicates:  No Known Allergies    ROS: 12 point review of systems negative other than the HPI    PHYSICAL EXAM:  Vitals:    03/24/25 2231 03/25/25 0446 03/25/25 0449 03/25/25 0821   BP: 130/65  111/68 100/65   Pulse: 60  (!) 57 (!) 58   Resp: 16   18   Temp: 98 °F (36.7 °C)  98.6 °F (37 °C) 98.2 °F (36.8 °C)   TempSrc: Oral  Oral Oral   SpO2: 99%  97% 98%   Weight:  52.2 kg (115 lb 1.3 oz)     Height:  5' 3" (1.6 m)       No intake or output data in the 24 hours ending 03/25/25 0908    GEN: WN/WD NAD  HEENT: normocephalic, atraumatic, PERRLA, EOMI, OP clear, nares patent  CV: RRR  RESP: Even and unlabored  ABD:  Soft, NT ND  :  No urine available for assessment, no suprapubic or CVA tenderness  EXT: no C/C/E  NEURO: no focal deficits, MAEW, AAOx4    LABS:  Recent Results (from the past 24 hours)   Urinalysis, Reflex to Urine Culture    Collection Time: 03/24/25 10:52 AM    Specimen: Urine   Result Value Ref Range    Color, UA Red-brown (A) Yellow, Light-Yellow, Dark Yellow, Wilma, Straw    Appearance, UA Extra Turbid (A) Clear    Specific Gravity, UA      pH, UA Color may interfere with results 5.0 - 8.5    Protein, UA Color may interfere with results Negative    Glucose, UA Color may interfere with results Negative, Normal    Ketones, UA Color may interfere with results Negative    Blood, UA Color may interfere with results Negative    Bilirubin, UA Color may interfere with results Negative    Urobilinogen, UA Color may interfere with results 0.2, 1.0, Normal    Nitrites, UA Color may interfere with results Negative    Leukocyte " Esterase, UA Color may interfere with results Negative    RBC, UA >100 (A) None Seen, 0-2, 3-5, 0-5 /HPF    WBC, UA 11-20 (A) None Seen, 0-2, 3-5, 0-5 /HPF    Bacteria, UA None Seen None Seen, Rare, Occasional /HPF    Squamous Epithelial Cells, UA None Seen /HPF   Urine culture    Collection Time: 03/24/25 10:52 AM    Specimen: Urine   Result Value Ref Range    Urine Culture No Growth At 24 Hours    Comprehensive metabolic panel    Collection Time: 03/24/25 11:16 AM   Result Value Ref Range    Sodium 140 136 - 145 mmol/L    Potassium 3.9 3.5 - 5.1 mmol/L    Chloride 106 98 - 107 mmol/L    CO2 26 23 - 31 mmol/L    Glucose 97 82 - 115 mg/dL    Blood Urea Nitrogen 16.6 9.8 - 20.1 mg/dL    Creatinine 1.12 (H) 0.55 - 1.02 mg/dL    Calcium 9.3 8.4 - 10.2 mg/dL    Protein Total 5.9 5.8 - 7.6 gm/dL    Albumin 3.3 (L) 3.4 - 4.8 g/dL    Globulin 2.6 2.4 - 3.5 gm/dL    Albumin/Globulin Ratio 1.3 1.1 - 2.0 ratio    Bilirubin Total 0.4 <=1.5 mg/dL    ALP 59 40 - 150 unit/L    ALT 28 0 - 55 unit/L    AST 26 11 - 45 unit/L    eGFR 50 mL/min/1.73/m2    Anion Gap 8.0 mEq/L    BUN/Creatinine Ratio 15    CBC with Differential    Collection Time: 03/24/25 11:16 AM   Result Value Ref Range    WBC 4.22 (L) 4.50 - 11.50 x10(3)/mcL    RBC 2.42 (L) 4.20 - 5.40 x10(6)/mcL    Hgb 7.4 (L) 12.0 - 16.0 g/dL    Hct 23.1 (L) 37.0 - 47.0 %    MCV 95.5 (H) 80.0 - 94.0 fL    MCH 30.6 27.0 - 31.0 pg    MCHC 32.0 (L) 33.0 - 36.0 g/dL    RDW 14.0 11.5 - 17.0 %    Platelet 266 130 - 400 x10(3)/mcL    MPV 9.6 7.4 - 10.4 fL    Neut % 63.7 %    Lymph % 23.7 %    Mono % 9.5 %    Eos % 1.9 %    Basophil % 0.7 %    Imm Grans % 0.5 %    Neut # 2.69 2.1 - 9.2 x10(3)/mcL    Lymph # 1.00 0.6 - 4.6 x10(3)/mcL    Mono # 0.40 0.1 - 1.3 x10(3)/mcL    Eos # 0.08 0 - 0.9 x10(3)/mcL    Baso # 0.03 <=0.2 x10(3)/mcL    Imm Gran # 0.02 0.00 - 0.04 x10(3)/mcL    NRBC% 0.0 %   Light Blue Top Hold    Collection Time: 03/24/25 11:21 AM   Result Value Ref Range    Extra Tube  Hold for add-ons.    Gold Top Hold    Collection Time: 03/24/25 11:21 AM   Result Value Ref Range    Extra Tube Hold for add-ons.    Pink Top Hold    Collection Time: 03/24/25 11:21 AM   Result Value Ref Range    Extra Tube Hold for add-ons.    Type & Screen    Collection Time: 03/24/25  1:42 PM   Result Value Ref Range    Group & Rh O POS     Indirect Jarad GEL NEG     Specimen Outdate 03/27/2025 23:59    Prepare RBC 1 Unit    Collection Time: 03/24/25  1:42 PM   Result Value Ref Range    UNIT NUMBER [id number]     UNIT ABO/RH O POS     DISPENSE STATUS Transfused     Unit Expiration [id number]     Product Code O9074F16     Unit Blood Type Code 5100     CROSSMATCH INTERPRETATION Compatible    ABORH RETYPE    Collection Time: 03/24/25  2:42 PM   Result Value Ref Range    ABORH Retype O POS    Lavender Top Hold    Collection Time: 03/24/25  2:46 PM   Result Value Ref Range    Extra Tube Hold for add-ons.    APTT    Collection Time: 03/25/25 12:35 AM   Result Value Ref Range    PTT 25.3 23.2 - 33.7 seconds   Protime-INR    Collection Time: 03/25/25 12:35 AM   Result Value Ref Range    PT 13.7 12.5 - 14.5 seconds    INR 1.0 <=1.3   Type & Screen    Collection Time: 03/25/25 12:35 AM   Result Value Ref Range    Group & Rh O POS     Indirect Jarad GEL NEG     Specimen Outdate 03/28/2025 23:59    Comprehensive Metabolic Panel (CMP)    Collection Time: 03/25/25  3:54 AM   Result Value Ref Range    Sodium 141 136 - 145 mmol/L    Potassium 3.8 3.5 - 5.1 mmol/L    Chloride 109 (H) 98 - 107 mmol/L    CO2 24 23 - 31 mmol/L    Glucose 92 82 - 115 mg/dL    Blood Urea Nitrogen 17.1 9.8 - 20.1 mg/dL    Creatinine 0.82 0.55 - 1.02 mg/dL    Calcium 8.4 8.4 - 10.2 mg/dL    Protein Total 4.9 (L) 5.8 - 7.6 gm/dL    Albumin 3.0 (L) 3.4 - 4.8 g/dL    Globulin 1.9 (L) 2.4 - 3.5 gm/dL    Albumin/Globulin Ratio 1.6 1.1 - 2.0 ratio    Bilirubin Total 0.4 <=1.5 mg/dL    ALP 60 40 - 150 unit/L    ALT 23 0 - 55 unit/L    AST 22 11 - 45  unit/L    eGFR >60 mL/min/1.73/m2    Anion Gap 8.0 mEq/L    BUN/Creatinine Ratio 21    Magnesium    Collection Time: 03/25/25  3:54 AM   Result Value Ref Range    Magnesium Level 1.80 1.60 - 2.60 mg/dL   CBC with Differential    Collection Time: 03/25/25  3:54 AM   Result Value Ref Range    WBC 4.36 (L) 4.50 - 11.50 x10(3)/mcL    RBC 2.53 (L) 4.20 - 5.40 x10(6)/mcL    Hgb 7.5 (L) 12.0 - 16.0 g/dL    Hct 22.5 (L) 37.0 - 47.0 %    MCV 88.9 80.0 - 94.0 fL    MCH 29.6 27.0 - 31.0 pg    MCHC 33.3 33.0 - 36.0 g/dL    RDW 15.5 11.5 - 17.0 %    Platelet 224 130 - 400 x10(3)/mcL    MPV 9.8 7.4 - 10.4 fL    Neut % 58.3 %    Lymph % 30.0 %    Mono % 9.4 %    Eos % 1.4 %    Basophil % 0.7 %    Imm Grans % 0.2 %    Neut # 2.54 2.1 - 9.2 x10(3)/mcL    Lymph # 1.31 0.6 - 4.6 x10(3)/mcL    Mono # 0.41 0.1 - 1.3 x10(3)/mcL    Eos # 0.06 0 - 0.9 x10(3)/mcL    Baso # 0.03 <=0.2 x10(3)/mcL    Imm Gran # 0.01 0.00 - 0.04 x10(3)/mcL    NRBC% 0.0 %       IMAGING:  CT ABDOMEN PELVIS WITHOUT CONTRAST     CLINICAL HISTORY:  Hematuria.Flank pain, kidney stone suspected;     TECHNIQUE:  Helical acquisition through the abdomen and pelvis without IV contrast.  Lack of contrast limits evaluation of solid organs and vascular structures .  Three plane reconstructions were provided for review.  mGycm. Automatic exposure control, adjustment of mA/kV or iterative reconstruction technique was used to reduce radiation.     COMPARISON:  No prior CT     FINDINGS:  The limited imaged lung bases are clear.     No acute findings noncontrast liver, gallbladder, spleen, pancreas or adrenals.     No hydronephrosis.  No renal calculi are seen.     No bowel obstruction. No significant inflammatory changes of the bowel.  Normal appendix.  No free air.     A nodular area is noted dependently in the bladder on image 111 series 2.  No pelvic free fluid.  Abdominal aorta normal in caliber.  Mild atherosclerotic disease.     There are moderate degenerative  changes of the spine.     Impression:     1. Nodular area in the bladder is indeterminate with neoplasm possible.  Recommend correlation with cystoscopy.  2. Otherwise no acute abdominopelvic findings on this noncontrast scan.        Electronically signed by:Chip Bolton  Date:                                            03/24/2025  Time:                                           14:02    ASSESSMENT:  79-year-old female who began with painless hematuria 03/09/2025, no improvement with antibiotics presents with weakness, anemia.  On Xarelto for AFib, last dose Sunday night  -urine culture with no growth thus far, prior cultures with no growth as well   -CT abdomen and pelvis reveals nodular area dependently in the bladder; normal appearance of bilateral kidneys without hydronephrosis, nephrolithiasis or ureterolithiasis.  -anemia secondary to above, stable this morning    PLAN:  -discussed imaging with the patient and family.    -she is currently voiding spontaneously without difficulty.  Hemodynamically stable.  She will need cystoscopy with possible TURBT, last dose of Xarelto Sunday p.m.. Will discuss timing with Dr. Roman. Keep NPO. Further to follow.  -Discussed with nursing      Wilma Jefferson NP         [1]   Social History  Tobacco Use    Smoking status: Never     Passive exposure: Never    Smokeless tobacco: Never   Substance Use Topics    Alcohol use: Not Currently    Drug use: Not Currently   [2]   Current Facility-Administered Medications   Medication Dose Route Frequency Provider Last Rate Last Admin    acetaminophen tablet 650 mg  650 mg Oral Q4H PRN Becky Tovar FNP   650 mg at 03/25/25 0248    aluminum-magnesium hydroxide-simethicone 200-200-20 mg/5 mL suspension 30 mL  30 mL Oral QID PRN Becky Tovar, FNP        bisacodyL suppository 10 mg  10 mg Rectal Daily PRN Becky Tovar, FNP        dextrose 50% injection 12.5 g  12.5 g Intravenous PRN Becky Tovar FNP        dextrose 50%  injection 25 g  25 g Intravenous PRN Becky Tovar FNP        glucagon (human recombinant) injection 1 mg  1 mg Intramuscular PRN Becky Tovar FNP        glucose chewable tablet 16 g  16 g Oral PRN Becky Tovar FNP        glucose chewable tablet 24 g  24 g Oral PRN Becky Tovar FNP        melatonin tablet 6 mg  6 mg Oral Nightly PRN Becky Tovar FNP        polyethylene glycol packet 17 g  17 g Oral BID PRN Becky Tovar FNP        sodium chloride 0.9% flush 10 mL  10 mL Intravenous PRN Becky Tovar FNP

## 2025-03-26 VITALS
SYSTOLIC BLOOD PRESSURE: 104 MMHG | BODY MASS INDEX: 20.39 KG/M2 | WEIGHT: 115.06 LBS | DIASTOLIC BLOOD PRESSURE: 67 MMHG | RESPIRATION RATE: 20 BRPM | HEIGHT: 63 IN | HEART RATE: 60 BPM | TEMPERATURE: 98 F | OXYGEN SATURATION: 97 %

## 2025-03-26 PROBLEM — R31.9 HEMATURIA: Status: ACTIVE | Noted: 2025-03-26

## 2025-03-26 LAB
ANION GAP SERPL CALC-SCNC: 8 MEQ/L
BACTERIA UR CULT: NORMAL
BASOPHILS # BLD AUTO: 0.04 X10(3)/MCL
BASOPHILS NFR BLD AUTO: 0.9 %
BUN SERPL-MCNC: 11.2 MG/DL (ref 9.8–20.1)
CALCIUM SERPL-MCNC: 8.5 MG/DL (ref 8.4–10.2)
CHLORIDE SERPL-SCNC: 107 MMOL/L (ref 98–107)
CO2 SERPL-SCNC: 25 MMOL/L (ref 23–31)
CREAT SERPL-MCNC: 0.82 MG/DL (ref 0.55–1.02)
CREAT/UREA NIT SERPL: 14
EOSINOPHIL # BLD AUTO: 0.09 X10(3)/MCL (ref 0–0.9)
EOSINOPHIL NFR BLD AUTO: 2.1 %
ERYTHROCYTE [DISTWIDTH] IN BLOOD BY AUTOMATED COUNT: 15 % (ref 11.5–17)
GFR SERPLBLD CREATININE-BSD FMLA CKD-EPI: >60 ML/MIN/1.73/M2
GLUCOSE SERPL-MCNC: 84 MG/DL (ref 82–115)
HCT VFR BLD AUTO: 24.4 % (ref 37–47)
HGB BLD-MCNC: 7.8 G/DL (ref 12–16)
IMM GRANULOCYTES # BLD AUTO: 0.02 X10(3)/MCL (ref 0–0.04)
IMM GRANULOCYTES NFR BLD AUTO: 0.5 %
LYMPHOCYTES # BLD AUTO: 1.22 X10(3)/MCL (ref 0.6–4.6)
LYMPHOCYTES NFR BLD AUTO: 27.9 %
MCH RBC QN AUTO: 29.2 PG (ref 27–31)
MCHC RBC AUTO-ENTMCNC: 32 G/DL (ref 33–36)
MCV RBC AUTO: 91.4 FL (ref 80–94)
MONOCYTES # BLD AUTO: 0.46 X10(3)/MCL (ref 0.1–1.3)
MONOCYTES NFR BLD AUTO: 10.5 %
NEUTROPHILS # BLD AUTO: 2.55 X10(3)/MCL (ref 2.1–9.2)
NEUTROPHILS NFR BLD AUTO: 58.1 %
NRBC BLD AUTO-RTO: 0 %
PLATELET # BLD AUTO: 228 X10(3)/MCL (ref 130–400)
PMV BLD AUTO: 9.6 FL (ref 7.4–10.4)
POTASSIUM SERPL-SCNC: 4.3 MMOL/L (ref 3.5–5.1)
RBC # BLD AUTO: 2.67 X10(6)/MCL (ref 4.2–5.4)
SODIUM SERPL-SCNC: 140 MMOL/L (ref 136–145)
WBC # BLD AUTO: 4.38 X10(3)/MCL (ref 4.5–11.5)

## 2025-03-26 PROCEDURE — 36415 COLL VENOUS BLD VENIPUNCTURE: CPT | Performed by: NURSE PRACTITIONER

## 2025-03-26 PROCEDURE — 80048 BASIC METABOLIC PNL TOTAL CA: CPT | Performed by: NURSE PRACTITIONER

## 2025-03-26 PROCEDURE — 85025 COMPLETE CBC W/AUTO DIFF WBC: CPT | Performed by: NURSE PRACTITIONER

## 2025-03-26 PROCEDURE — 25000003 PHARM REV CODE 250: Performed by: INTERNAL MEDICINE

## 2025-03-26 PROCEDURE — 0TJB8ZZ INSPECTION OF BLADDER, VIA NATURAL OR ARTIFICIAL OPENING ENDOSCOPIC: ICD-10-PCS | Performed by: SPECIALIST

## 2025-03-26 RX ORDER — METOPROLOL TARTRATE 25 MG/1
12.5 TABLET ORAL 2 TIMES DAILY
Status: DISCONTINUED | OUTPATIENT
Start: 2025-03-26 | End: 2025-03-26 | Stop reason: HOSPADM

## 2025-03-26 RX ORDER — METOPROLOL TARTRATE 25 MG/1
12.5 TABLET, FILM COATED ORAL 2 TIMES DAILY
Qty: 30 TABLET | Refills: 0 | Status: SHIPPED | OUTPATIENT
Start: 2025-03-26 | End: 2025-04-25

## 2025-03-26 RX ADMIN — LIOTHYRONINE SODIUM 5 MCG: 5 TABLET ORAL at 09:03

## 2025-03-26 RX ADMIN — DICLOFENAC SODIUM 2 G: 10 GEL TOPICAL at 09:03

## 2025-03-26 RX ADMIN — ATORVASTATIN CALCIUM 10 MG: 10 TABLET, FILM COATED ORAL at 10:03

## 2025-03-26 NOTE — OP NOTE
[unfilled]  Pema Bianca Ruiz    Pre-Operative Diagnosis: Gross Hematuria    Post-Operative Diagnosis:  Radiation Cystitis    Procedure: Flexible Cystoscopy    Surgeon: Liborio Roman MD    Findings:  We were asked to see this patient for gross hematuria.  CT urogram was unremarkable other than probable blood products in the bladder.  Significant history of hysterectomy and pelvic radiation for uterine cancer.    Procedure in Detail:  The patient's genitalia were prepped and draped in normal sterile fashion.  The 22 [place] bedside portable cystoscope was introduced to the bladder.  The urethra was normal.  The bladder mucosa was inspected in its entirety.  We see diffuse telangiectasia worse on the posterior wall.  This is most consistent with postradiation changes in his likely the cause of her hematuria.  No mucosal lesions were seen.  Ureteral orifices were normal in position configuration bilaterally with clear efflux bilaterally.  The scope was then withdrawn.  The patient tolerated procedure well.    Signed,    Liborio Roman MD

## 2025-03-26 NOTE — PROGRESS NOTES
UROLOGY  PROGRESS  NOTE    Pema Ruiz 1945  [id number]  3/26/2025    Patient resting in bed   She is not having any hematuria today   Voiding without difficulty    at bedside  Reports history of hysterectomy and radiation for uterine cancer several years ago      Intake/Output:  No intake/output data recorded.  No intake/output data recorded.     Exam:    NAD  Card: RRR  Resp: unlabored  Abd:  Soft, NT ND  :  Bedside cystoscopy performed by Dr. Roman, no active bleeding noted see procedure note for further details  Extremity: no C/C/E    Recent Results (from the past 24 hours)   Basic Metabolic Panel    Collection Time: 03/26/25  4:31 AM   Result Value Ref Range    Sodium 140 136 - 145 mmol/L    Potassium 4.3 3.5 - 5.1 mmol/L    Chloride 107 98 - 107 mmol/L    CO2 25 23 - 31 mmol/L    Glucose 84 82 - 115 mg/dL    Blood Urea Nitrogen 11.2 9.8 - 20.1 mg/dL    Creatinine 0.82 0.55 - 1.02 mg/dL    BUN/Creatinine Ratio 14     Calcium 8.5 8.4 - 10.2 mg/dL    Anion Gap 8.0 mEq/L    eGFR >60 mL/min/1.73/m2   CBC with Differential    Collection Time: 03/26/25  5:50 AM   Result Value Ref Range    WBC 4.38 (L) 4.50 - 11.50 x10(3)/mcL    RBC 2.67 (L) 4.20 - 5.40 x10(6)/mcL    Hgb 7.8 (L) 12.0 - 16.0 g/dL    Hct 24.4 (L) 37.0 - 47.0 %    MCV 91.4 80.0 - 94.0 fL    MCH 29.2 27.0 - 31.0 pg    MCHC 32.0 (L) 33.0 - 36.0 g/dL    RDW 15.0 11.5 - 17.0 %    Platelet 228 130 - 400 x10(3)/mcL    MPV 9.6 7.4 - 10.4 fL    Neut % 58.1 %    Lymph % 27.9 %    Mono % 10.5 %    Eos % 2.1 %    Basophil % 0.9 %    Imm Grans % 0.5 %    Neut # 2.55 2.1 - 9.2 x10(3)/mcL    Lymph # 1.22 0.6 - 4.6 x10(3)/mcL    Mono # 0.46 0.1 - 1.3 x10(3)/mcL    Eos # 0.09 0 - 0.9 x10(3)/mcL    Baso # 0.04 <=0.2 x10(3)/mcL    Imm Gran # 0.02 0.00 - 0.04 x10(3)/mcL    NRBC% 0.0 %     CT Urogram Abd Pelvis W WO [[id number]] Resulted: 03/26/25 0919   Order Status: Completed Updated: 03/26/25 0921   Narrative:     EXAMINATION:  CT UROGRAM ABD  PELVIS W WO    CLINICAL HISTORY:  Hematuria, gross/macroscopic;    TECHNIQUE:  Helically acquired images with axial, sagittal and coronal reformations were obtained from the lung bases to the pubic symphysis prior to and after the IV administration of contrast.    Automated tube current modulation, weight-based exposure dosing, and/or iterative reconstruction technique utilized to reach lowest reasonably achievable exposure rate.    DLP: 652 mGy*cm    COMPARISON:  CT abdomen pelvis 03/24/2025    FINDINGS:  HEART: Low-density blood pool with visualization of the interventricular septum compatible with anemia.  There are coronary artery calcifications.    LUNG BASES: Well aerated.    LIVER: Normal attenuation. No appreciable focal hepatic lesion.    BILIARY: No calcified gallstones.    PANCREAS: No inflammatory change.    SPLEEN: Normal in size    ADRENALS: No mass.    KIDNEYS/URETERS: No renal or ureteral calculus. No hydronephrosis.  The kidneys enhance symmetrically. There is a small cyst at the upper pole left kidney.    GI TRACT/MESENTERY:  No evidence of bowel obstruction or inflammation.    PERITONEUM: No free fluid.No free air.    LYMPH NODES: No enlarged lymph nodes by size criteria.    VASCULATURE: Aortoiliac atherosclerosis.    BLADDER: The bladder is empty which limits evaluation.  Questionable small area of linear enhancement at the posterior bladder wall to the left of midline just above the left ureterovesical junction.    REPRODUCTIVE ORGANS: Uterus is surgically absent.    ABDOMINAL WALL: Unremarkable.    BONES: Degenerative change at the lumbar spine with bulky calcified disc osteophytes encroaching on the central canal.   Impression:       1. No appreciable renal mass.  2. The bladder is collapsed which limits evaluation.  There is mild linear enhancement at the posterior bladder wall without definite nodular correlate for the abnormality described on prior noncontrast exam.  Defer to  cystoscopy.      Electronically signed by: Tasneem Alfaro  Date: 03/26/2025  Time: 09:19       Assessment:  79-year-old female who began with painless hematuria 03/09/2025, no improvement with antibiotics presents with weakness, anemia.  On Xarelto for AFib, last dose Sunday night  -urine culture with no growth thus far, prior cultures with no growth as well   -CT abdomen and pelvis reveals nodular area dependently in the bladder; normal appearance of bilateral kidneys without hydronephrosis, nephrolithiasis or ureterolithiasis.  -CT urogram without evidence of renal mass or nodularity in bladder  -anemia secondary to above, stable   -s/p flexible cystoscopy at the bedside.  Telangiectasias noted, suspect hematuria secondary to radiation cystitis       Plan:  -the patient is stable for discharge from Urology standpoint   -she will follow up outpatient in 2 weeks  -okay to resume Xarelto, discussed if hematuria recurs we will plan for a cystoscopy in the office   -discussed with nursing      Wilma Jefferson NP

## 2025-03-26 NOTE — PROGRESS NOTES
[facility]  Hospital Medicine Progress Note        Chief Complaint: Inpatient Follow-up for     HPI: 79 old female with past medical history of AFib hypothyroidism, hyperlipidemia, dementia, basal cell carcinoma presented to ER today with hematuria patient reports it all started almost 1 month back she went to urgent care where she was told she has a UTI was prescribed antibiotics but patient's symptoms did not improve so went to her PCP who referred her to Urology but apparently she started having generalized weakness so she was instructed to go to ER where she had CT of the abdomen and pelvis done that showed nodular area in the bladder we showed greater than 100 RBC no bacteriuria hemoglobin of 7.5. Patient was transferred [facility] on hold heart rate on the lower side urology consulted  Patient had CT urogram done that showed enhancement at the posterior bladder wall.  Urology following for possible cystoscopy today  Interval Hx:   Patient and examined this morning slightly bradycardic  Case was discussed with patient's nurse and  on the floor.    Objective/physical exam:  General: In no acute distress, afebrile  Chest: Clear to auscultation bilaterally  Heart: RRR, +S1, S2, no appreciable murmur  Abdomen: Soft, nontender, BS +  MSK: Warm, no lower extremity edema, no clubbing or cyanosis  Neurologic: Alert and oriented x4,   VITAL SIGNS: 24 HRS MIN & MAX LAST   Temp  Min: 97.9 °F (36.6 °C)  Max: 98.4 °F (36.9 °C) 98.4 °F (36.9 °C)   BP  Min: 98/62  Max: 132/71 98/62   Pulse  Min: 55  Max: 75  (!) 55   Resp  Min: 18  Max: 18 18   SpO2  Min: 97 %  Max: 99 % 97 %     I have reviewed the following labs:  Recent Labs   Lab 03/24/25  1116 03/25/25  0354 03/26/25  0550   WBC 4.22* 4.36* 4.38*   RBC 2.42* 2.53* 2.67*   HGB 7.4* 7.5* 7.8*   HCT 23.1* 22.5* 24.4*   MCV 95.5* 88.9 91.4   MCH 30.6 29.6 29.2   MCHC 32.0* 33.3 32.0*   RDW 14.0 15.5 15.0   PLT  266 224 228   MPV 9.6 9.8 9.6     Recent Labs   Lab 03/24/25  1116 03/25/25  0354 03/26/25  0431    141 140   K 3.9 3.8 4.3    109* 107   CO2 26 24 25   BUN 16.6 17.1 11.2   CREATININE 1.12* 0.82 0.82   CALCIUM 9.3 8.4 8.5   MG  --  1.80  --    ALBUMIN 3.3* 3.0*  --    ALKPHOS 59 60  --    ALT 28 23  --    AST 26 22  --    BILITOT 0.4 0.4  --      Microbiology Results (last 7 days)       ** No results found for the last 168 hours. **             See below for Radiology    Assessment/Plan:  Hematuria   Nodular area in the bladder rule out cancer   History of hypothyroidism   Hyperlipidemia   Paroxysmal AFib but now bradycardic   Basal cell carcinoma   Dementia      Patient had CT urogram done that showed enhancement at the posterior bladder wall  Hemoglobin of 7.8 possible cystoscopy today  Close watch on H&H  if it  drops to less than 7 will transfuse  All anticoagulation on hold, urology consulted  Plan for cystoscopy as per Urology  Heart rate on the lower side I will reduce the dose of metoprolol we will hold off on Cardizem  We will switch metoprolol to metoprolol tartrate 12.5 p.o. b.i.d. will keep a close watch on patient's heart rate I will discontinue metoprolol succinate if patient is still bradycardic then we will completely discontinue metoprolol  Xarelto on hold  Repeat blood work in a.m.   Prophylaxis: SCD    VTE prophylaxis:     Patient condition:  Stable/Fair/Guarded/ Serious/ Critical    Anticipated discharge and Disposition:         All diagnosis and differential diagnosis have been reviewed; assessment and plan has been documented; I have personally reviewed the labs and test results that are presently available; I have reviewed the patients medication list; I have reviewed the consulting providers response and recommendations. I have reviewed or attempted to review medical records based upon their availability    All of the patient's questions have been  addressed and answered.  Patient's is agreeable to the above stated plan. I will continue to monitor closely and make adjustments to medical management as needed.    Portions of this note dictated using EMR integrated voice recognition software, and may be subject to voice recognition errors not corrected at proofreading. Please contact writer for clarification if needed.   _____________________________________________________________________    Malnutrition Status:  Nutrition consulted. Most recent weight and BMI monitored-     Measurements:  Wt Readings from Last 1 Encounters:   03/26/25 52.2 kg (115 lb 1.3 oz)   Body mass index is 20.39 kg/m².    Patient has been screened and assessed by RD.    Malnutrition Type:  Context:    Level:      Malnutrition Characteristic Summary:       Interventions/Recommendations (treatment strategy):        Scheduled Med:   atorvastatin  10 mg Oral Daily    diclofenac sodium  2 g Topical (Top) Daily    liothyronine  5 mcg Oral Daily    metoprolol succinate  50 mg Oral Daily    traZODone  50 mg Oral QHS      Continuous Infusions:     PRN Meds:    Current Facility-Administered Medications:     acetaminophen, 650 mg, Oral, Q4H PRN    aluminum-magnesium hydroxide-simethicone, 30 mL, Oral, QID PRN    bisacodyL, 10 mg, Rectal, Daily PRN    dextrose 50%, 12.5 g, Intravenous, PRN    dextrose 50%, 25 g, Intravenous, PRN    glucagon (human recombinant), 1 mg, Intramuscular, PRN    glucose, 16 g, Oral, PRN    glucose, 24 g, Oral, PRN    melatonin, 6 mg, Oral, Nightly PRN    polyethylene glycol, 17 g, Oral, BID PRN    sodium chloride 0.9%, 10 mL, Intravenous, PRN     Radiology:  I have personally reviewed the following imaging and agree with the radiologist.     CT Abdomen Pelvis  Without Contrast  Narrative: EXAMINATION:  CT ABDOMEN PELVIS WITHOUT CONTRAST    CLINICAL HISTORY:  Hematuria.Flank pain, kidney stone suspected;    TECHNIQUE:  Helical acquisition through the abdomen and pelvis without IV contrast.  Lack of  contrast limits evaluation of solid organs and vascular structures .  Three plane reconstructions were provided for review.  mGycm. Automatic exposure control, adjustment of mA/kV or iterative reconstruction technique was used to reduce radiation.    COMPARISON:  No prior CT    FINDINGS:  The limited imaged lung bases are clear.    No acute findings noncontrast liver, gallbladder, spleen, pancreas or adrenals.    No hydronephrosis.  No renal calculi are seen.    No bowel obstruction. No significant inflammatory changes of the bowel.  Normal appendix.  No free air.    A nodular area is noted dependently in the bladder on image 111 series 2.  No pelvic free fluid.  Abdominal aorta normal in caliber.  Mild atherosclerotic disease.    There are moderate degenerative changes of the spine.  Impression: 1. Nodular area in the bladder is indeterminate with neoplasm possible.  Recommend correlation with cystoscopy.  2. Otherwise no acute abdominopelvic findings on this noncontrast scan.    Electronically signed by: Chip Bolton  Date:    03/24/2025  Time:    14:02      Chantel Martin MD  Department of Hospital Medicine   [facility]   03/26/2025

## 2025-03-27 ENCOUNTER — PATIENT OUTREACH (OUTPATIENT)
Dept: ADMINISTRATIVE | Facility: CLINIC | Age: 80
End: 2025-03-27
Payer: MEDICARE

## 2025-03-27 NOTE — PROGRESS NOTES
C3 nurse spoke with Pema Ruiz and  for a TCC post hospital discharge follow up call. The patient states she is changing PCPs and does not have HOSFU scheduled currently.  C3 nurse was unable to schedule HOSFU appointment for unknown PCP. Patient advised to contact PCP once established to schedule a HOSFU within 5-7 days.     Patient does have follow up scheduled with  Jose Tobin NP (Urology) on 4/10/25 @ 2 pm

## 2025-04-14 NOTE — DISCHARGE SUMMARY
"[facility] Hospital Medicine Discharge Summary    Admit Date: 3/24/2025  Discharge Date and Time:  March 26, 2025   Admitting Physician:  Team  Discharging Physician: Chnatel Martin MD.  Primary Care Physician: Eunice Bagley MD  Consults: Urology    Discharge Diagnoses:  Please see March 26, 2025 note    Hospital Course:   Please see March 26, 2025 note    Pt was seen and examined on the day of discharge  Vitals:  VITAL SIGNS: 24 HRS MIN & MAX LAST   No data recorded 97.9 °F (36.6 °C)   No data recorded 104/67   No data recorded  60   No data recorded 20   No data recorded 97 %       Physical Exam:  Please see March 26, 2025 note      Procedures Performed: No admission procedures for hospital encounter.     Significant Diagnostic Studies: See Full reports for all details    No results for input(s): "WBC", "RBC", "HGB", "HCT", "MCV", "MCH", "MCHC", "RDW", "PLT", "MPV", "GRAN", "LYMPH", "MONO", "BASO", "NRBC" in the last 168 hours.    No results for input(s): "NA", "K", "CL", "CO2", "ANIONGAP", "BUN", "CREATININE", "GLU", "CALCIUM", "PH", "MG", "ALBUMIN", "PROT", "ALKPHOS", "ALT", "AST", "BILITOT" in the last 168 hours.     Microbiology Results (last 7 days)       ** No results found for the last 168 hours. **             CT Urogram Abd Pelvis W WO  Narrative: EXAMINATION:  CT UROGRAM ABD PELVIS W WO    CLINICAL HISTORY:  Hematuria, gross/macroscopic;    TECHNIQUE:  Helically acquired images with axial, sagittal and coronal reformations were obtained from the lung bases to the pubic symphysis prior to and after the IV administration of contrast.    Automated tube current modulation, weight-based exposure dosing, and/or iterative reconstruction technique utilized to reach lowest reasonably achievable exposure rate.    DLP: 652 mGy*cm    COMPARISON:  CT abdomen pelvis 03/24/2025    FINDINGS:  HEART: Low-density blood pool with visualization of the interventricular septum compatible " with anemia.  There are coronary artery calcifications.    LUNG BASES: Well aerated.    LIVER: Normal attenuation. No appreciable focal hepatic lesion.    BILIARY: No calcified gallstones.    PANCREAS: No inflammatory change.    SPLEEN: Normal in size    ADRENALS: No mass.    KIDNEYS/URETERS: No renal or ureteral calculus. No hydronephrosis.  The kidneys enhance symmetrically. There is a small cyst at the upper pole left kidney.    GI TRACT/MESENTERY:  No evidence of bowel obstruction or inflammation.    PERITONEUM: No free fluid.No free air.    LYMPH NODES: No enlarged lymph nodes by size criteria.    VASCULATURE: Aortoiliac atherosclerosis.    BLADDER: The bladder is empty which limits evaluation.  Questionable small area of linear enhancement at the posterior bladder wall to the left of midline just above the left ureterovesical junction.    REPRODUCTIVE ORGANS: Uterus is surgically absent.    ABDOMINAL WALL: Unremarkable.    BONES: Degenerative change at the lumbar spine with bulky calcified disc osteophytes encroaching on the central canal.  Impression: 1. No appreciable renal mass.  2. The bladder is collapsed which limits evaluation.  There is mild linear enhancement at the posterior bladder wall without definite nodular correlate for the abnormality described on prior noncontrast exam.  Defer to cystoscopy.    Electronically signed by: Tasneem Alfaro  Date:    03/26/2025  Time:    09:19         Medication List        START taking these medications      metoprolol tartrate 25 MG tablet  Commonly known as: LOPRESSOR  Take 0.5 tablets (12.5 mg total) by mouth 2 (two) times daily.            CONTINUE taking these medications      atorvastatin 10 MG tablet  Commonly known as: LIPITOR     diltiaZEM 120 MG tablet  Commonly known as: CARDIZEM     liothyronine 5 MCG Tab  Commonly known as: CYTOMEL  TAKE 1 TABLET BY MOUTH EVERY DAY FOR 90 DAYS     traZODone 50 MG tablet  Commonly known as: DESYREL  Take 1 tablet (50  mg total) by mouth every evening.     vitamin D 1000 units Tab  Commonly known as: VITAMIN D3     XARELTO 20 mg Tab  Generic drug: rivaroxaban            STOP taking these medications      cephALEXin 500 MG capsule  Commonly known as: KEFLEX     metoprolol succinate 50 MG 24 hr tablet  Commonly known as: TOPROL-XL               Where to Get Your Medications        These medications were sent to [facility]/pharmacy #[id number] - [place]ijano - [address]  [address], [place] [address]      Phone: [phone]   metoprolol tartrate 25 MG tablet          Explained in detail to the patient about the discharge plan, medications, and follow-up visits. Pt understands and agrees with the treatment plan  Discharge Disposition: Home or Self Care   Discharged Condition: stable  Diet-    Medications Per [place] med rec  Activities as tolerated   Follow-up Information       Eunice Bagley MD Follow up in 1 week(s).    Specialty: Family Medicine  Contact information:  [address]  [place] [address]  [phone]               Liborio Roman MD Follow up in 1 week(s).    Specialty: Urology  Contact information:  [address] [facility] [facility] [address]  [place] [address]  [phone]                           For further questions contact hospitalist office    Discharge time 33 minutes    For worsening symptoms, chest pain, shortness of breath, increased abdominal pain, high grade fever, stroke or stroke like symptoms, immediately go to the nearest Emergency Room or call 911 as soon as possible.      Chantel Lorenzo M.D, on 4/14/2025. at 1:30 PM.